# Patient Record
Sex: FEMALE | Race: WHITE | NOT HISPANIC OR LATINO | Employment: PART TIME | URBAN - METROPOLITAN AREA
[De-identification: names, ages, dates, MRNs, and addresses within clinical notes are randomized per-mention and may not be internally consistent; named-entity substitution may affect disease eponyms.]

---

## 2018-04-09 ENCOUNTER — OFFICE VISIT (OUTPATIENT)
Dept: OBSTETRICS AND GYNECOLOGY | Facility: CLINIC | Age: 38
End: 2018-04-09
Payer: COMMERCIAL

## 2018-04-09 VITALS
DIASTOLIC BLOOD PRESSURE: 82 MMHG | WEIGHT: 140.19 LBS | BODY MASS INDEX: 21.96 KG/M2 | SYSTOLIC BLOOD PRESSURE: 112 MMHG

## 2018-04-09 DIAGNOSIS — D50.0 IRON DEFICIENCY ANEMIA DUE TO CHRONIC BLOOD LOSS: ICD-10-CM

## 2018-04-09 DIAGNOSIS — D25.9 UTERINE LEIOMYOMA, UNSPECIFIED LOCATION: ICD-10-CM

## 2018-04-09 DIAGNOSIS — Z01.411 ENCOUNTER FOR GYNECOLOGICAL EXAMINATION WITH ABNORMAL FINDING: Primary | ICD-10-CM

## 2018-04-09 DIAGNOSIS — N92.0 MENORRHAGIA WITH REGULAR CYCLE: ICD-10-CM

## 2018-04-09 DIAGNOSIS — Z12.4 ENCOUNTER FOR PAP SMEAR OF CERVIX WITH HPV DNA COTESTING: ICD-10-CM

## 2018-04-09 PROCEDURE — 88175 CYTOPATH C/V AUTO FLUID REDO: CPT

## 2018-04-09 PROCEDURE — 99999 PR PBB SHADOW E&M-EST. PATIENT-LVL III: CPT | Mod: PBBFAC,,, | Performed by: OBSTETRICS & GYNECOLOGY

## 2018-04-09 PROCEDURE — 99395 PREV VISIT EST AGE 18-39: CPT | Mod: S$GLB,,, | Performed by: OBSTETRICS & GYNECOLOGY

## 2018-04-09 PROCEDURE — 87624 HPV HI-RISK TYP POOLED RSLT: CPT

## 2018-04-09 NOTE — PROGRESS NOTES
Chief Complaint   Patient presents with    Well Woman    Breast Pain     right       History of Present Illness: Bharti Camejo is a 37 y.o. female that presents today 2018 for well gyn visit. Severe heavy periods regular with heaviest pad with 2 hour pad changes.     History reviewed. No pertinent past medical history.    Past Surgical History:   Procedure Laterality Date     SECTION      X4    TONSILLECTOMY, ADENOIDECTOMY      TUBAL LIGATION      with 4th     WISDOM TOOTH EXTRACTION         No current outpatient prescriptions on file.     No current facility-administered medications for this visit.        Review of patient's allergies indicates:  No Known Allergies    Family History   Problem Relation Age of Onset    Hypertension Paternal Grandfather     Heart disease Maternal Grandmother     Heart disease Maternal Grandfather     Hypertension Maternal Grandfather     Hypertension Father     Heart disease Father     Breast cancer Neg Hx     Ovarian cancer Neg Hx        Social History     Social History    Marital status:      Spouse name: N/A    Number of children: N/A    Years of education: N/A     Social History Main Topics    Smoking status: Never Smoker    Smokeless tobacco: Never Used    Alcohol use No    Drug use: No    Sexual activity: Yes     Partners: Male     Birth control/ protection: None     Other Topics Concern    None     Social History Narrative    None       OB History    Para Term  AB Living   4 4 4     4   SAB TAB Ectopic Multiple Live Births           4      # Outcome Date GA Lbr Fernandez/2nd Weight Sex Delivery Anes PTL Lv   4 Term 13   3.43 kg (7 lb 9 oz) F CS-LTranv Spinal N BI      Birth Comments: System Generated. Please review and update pregnancy details.   3 Term 09 39w0d  3.912 kg (8 lb 10 oz) M CS-LTranv Spinal  BI      Birth Comments: spotted in begin, pelvic rest marginal plecenta resolved around 20  weeks   2 Term 09/24/07 39w0d  3.26 kg (7 lb 3 oz) F CS-LTranv EPI  BI      Birth Comments: no complications   1 Term 04/12/06 39w0d  3.204 kg (7 lb 1 oz) M CS-LTranv Spinal  BI      Birth Comments: nucol cord x 4          Review of Symptoms:  GENERAL: Denies weight gain or weight loss. Feeling well overall.   SKIN: Denies rash or lesions.   HEAD: Denies head injury or headache.   NODES: Denies enlarged lymph nodes.   CHEST: Denies chest pain or shortness of breath.   CARDIOVASCULAR: Denies palpitations or left sided chest pain.   ABDOMEN: No abdominal pain, constipation, diarrhea, nausea, vomiting or rectal bleeding.   URINARY: No frequency, dysuria, hematuria, or burning on urination.  HEMATOLOGIC: No easy bruisability or excessive bleeding.   MUSCULOSKELETAL: Denies joint pain or swelling.     /82   Wt 63.6 kg (140 lb 3.4 oz)   LMP 03/19/2018   Physical Exam:  APPEARANCE: Well nourished, well developed, in no acute distress.  SKIN: Normal skin turgor, no lesions.  NECK: Neck symmetric without masses   RESPIRATORY: Normal respiratory effort with no retractions or use of accessory muscles  CARDIOVASCULAR: Peripheral vascular system with no swelling no varicosities and palpation of pulses normal  LYMPHATIC: No enlargements of the lymph nodes noted in the neck, axillae, or groin  ABDOMEN: Soft. No tenderness or masses. No hepatosplenomegaly. No hernias.  BREASTS: Symmetrical, no skin changes or visible lesions. No palpable masses, nipple discharge or adenopathy bilaterally.  PELVIC: Normal external female genitalia without lesions. Normal hair distribution. Adequate perineal body, normal urethral meatus. Urethra with no masses.  Bladder nontender. Vagina moist and well rugated without lesions or discharge. Cervix pink and without lesions. No significant cystocele or rectocele. Bimanual exam showed uterus normal size, shape, position, mobile and nontender. Adnexa without masses or tenderness. Urethra and  bladder normal.   EXTREMITIES: No clubbing cyanosis or edema.    ASSESSMENT/PLAN:  Encounter for gynecological examination with abnormal finding    Encounter for Pap smear of cervix with HPV DNA cotesting  -     Liquid-based pap smear, screening  -     HPV High Risk Genotypes, PCR    Iron deficiency anemia due to chronic blood loss    Menorrhagia with regular cycle    Uterine leiomyoma, unspecified location        We discussed OCPS, IUD, ablation, and hysterectomy.       Patient was counseled today on Pap guidelines, recommendation for pelvic exams, mammograms every other year after the age of 40 and annually after the age of 50, Colonoscopy after the age of 50, Dexa Bone Scan and calcium and vitamin D supplementation in menopause and to see her PCP for other health maintenance.   FOLLOW-UP:prn

## 2018-04-13 LAB
HPV16 AG SPEC QL: NEGATIVE
HPV16+18+H RISK 12 DNA CVX-IMP: NEGATIVE
HPV18 DNA SPEC QL NAA+PROBE: NEGATIVE

## 2018-06-07 ENCOUNTER — OFFICE VISIT (OUTPATIENT)
Dept: OBSTETRICS AND GYNECOLOGY | Facility: CLINIC | Age: 38
End: 2018-06-07
Payer: COMMERCIAL

## 2018-06-07 VITALS — HEIGHT: 67 IN | BODY MASS INDEX: 21.8 KG/M2 | WEIGHT: 138.88 LBS | RESPIRATION RATE: 18 BRPM

## 2018-06-07 DIAGNOSIS — D25.9 UTERINE LEIOMYOMA, UNSPECIFIED LOCATION: ICD-10-CM

## 2018-06-07 DIAGNOSIS — N92.0 MENORRHAGIA WITH REGULAR CYCLE: Primary | ICD-10-CM

## 2018-06-07 DIAGNOSIS — Z01.818 PRE-OP TESTING: ICD-10-CM

## 2018-06-07 LAB
B-HCG UR QL: NEGATIVE
CTP QC/QA: YES

## 2018-06-07 PROCEDURE — 88305 TISSUE EXAM BY PATHOLOGIST: CPT | Mod: 26,,, | Performed by: PATHOLOGY

## 2018-06-07 PROCEDURE — 99024 POSTOP FOLLOW-UP VISIT: CPT | Mod: S$GLB,,, | Performed by: OBSTETRICS & GYNECOLOGY

## 2018-06-07 PROCEDURE — 81025 URINE PREGNANCY TEST: CPT | Mod: S$GLB,,, | Performed by: OBSTETRICS & GYNECOLOGY

## 2018-06-07 PROCEDURE — 99999 PR PBB SHADOW E&M-EST. PATIENT-LVL III: CPT | Mod: PBBFAC,,, | Performed by: OBSTETRICS & GYNECOLOGY

## 2018-06-07 PROCEDURE — 88305 TISSUE EXAM BY PATHOLOGIST: CPT | Performed by: PATHOLOGY

## 2018-06-07 PROCEDURE — 58100 BIOPSY OF UTERUS LINING: CPT | Mod: S$GLB,,, | Performed by: OBSTETRICS & GYNECOLOGY

## 2018-06-07 RX ORDER — SODIUM CHLORIDE, SODIUM LACTATE, POTASSIUM CHLORIDE, CALCIUM CHLORIDE 600; 310; 30; 20 MG/100ML; MG/100ML; MG/100ML; MG/100ML
INJECTION, SOLUTION INTRAVENOUS CONTINUOUS
Status: CANCELLED | OUTPATIENT
Start: 2018-06-07

## 2018-06-07 NOTE — PROGRESS NOTES
Chief Complaint   Patient presents with    Pre-op Exam     emb/ d&c       History of Present Illness: Bharti Camejo is a 38 y.o. female that presents today 2018 for   Chief Complaint   Patient presents with    Pre-op Exam     emb/ d&c         No past medical history on file.    Past Surgical History:   Procedure Laterality Date     SECTION      X4    TONSILLECTOMY, ADENOIDECTOMY      TUBAL LIGATION      with 4th     WISDOM TOOTH EXTRACTION         No current outpatient prescriptions on file.     No current facility-administered medications for this visit.        Review of patient's allergies indicates:  No Known Allergies    Family History   Problem Relation Age of Onset    Hypertension Paternal Grandfather     Heart disease Maternal Grandmother     Heart disease Maternal Grandfather     Hypertension Maternal Grandfather     Hypertension Father     Heart disease Father     Breast cancer Neg Hx     Ovarian cancer Neg Hx        Social History   Substance Use Topics    Smoking status: Never Smoker    Smokeless tobacco: Never Used    Alcohol use No       OB History    Para Term  AB Living   4 4 4     4   SAB TAB Ectopic Multiple Live Births           4      # Outcome Date GA Lbr Fernandez/2nd Weight Sex Delivery Anes PTL Lv   4 Term 13   3.43 kg (7 lb 9 oz) F CS-LTranv Spinal N BI      Birth Comments: System Generated. Please review and update pregnancy details.   3 Term 09 39w0d  3.912 kg (8 lb 10 oz) M CS-LTranv Spinal  BI      Birth Comments: spotted in begin, pelvic rest marginal plecenta resolved around 20 weeks   2 Term 07 39w0d  3.26 kg (7 lb 3 oz) F CS-LTranv EPI  BI      Birth Comments: no complications   1 Term 06 39w0d  3.204 kg (7 lb 1 oz) M CS-LTranv Spinal  BI      Birth Comments: nucol cord x 4          Review of Symptoms:  GENERAL: Denies weight gain or weight loss. Feeling well overall.   SKIN: Denies rash or lesions.  "  HEAD: Denies head injury or headache.   NODES: Denies enlarged lymph nodes.   CHEST: Denies chest pain or shortness of breath.   CARDIOVASCULAR: Denies palpitations or left sided chest pain.   ABDOMEN: No abdominal pain, constipation, diarrhea, nausea, vomiting or rectal bleeding.   URINARY: No frequency, dysuria, hematuria, or burning on urination.  HEMATOLOGIC: No easy bruisability or excessive bleeding.   MUSCULOSKELETAL: Denies joint pain or swelling.     Resp 18   Ht 5' 7" (1.702 m)   Wt 63 kg (138 lb 14.2 oz)   Physical Exam:  APPEARANCE: Well nourished, well developed, in no acute distress.  SKIN: Normal skin turgor, no lesions.  NECK: Neck symmetric without masses   RESPIRATORY: Normal respiratory effort with no retractions or use of accessory muscles  CARDIOVASCULAR: Peripheral vascular system with no swelling no varicosities and palpation of pulses normal  LYMPHATIC: No enlargements of the lymph nodes noted in the neck, axillae, or groin  ABDOMEN: Soft. No tenderness or masses. No hepatosplenomegaly. No hernias.  PELVIC: Normal external female genitalia without lesions. Normal hair distribution. Adequate perineal body, normal urethral meatus. Urethra with no masses.  Bladder nontender. Vagina moist and well rugated without lesions or discharge. Cervix pink and without lesions. No significant cystocele or rectocele. Bimanual exam showed uterus normal size, shape, position, mobile and nontender. Adnexa without masses or tenderness. Urethra and bladder normal.  EXTREMITIES: No clubbing cyanosis or edema.    ENDOMETRIAL BIOPSY:    Female patient presents for an endometrial biopsy due to abnormal bleeding.      UPT is negative.    PRE ENDOMETRIAL BIOPSY COUNSELING:  The patient was informed of the risk of bleeding, infection, uterine perforation and pain and that the test will rule-out endometrial cancer with accuracy greater than 95%. She was counseled on the alternatives to endometrial biopsy and agrees " to proceed.    PROCEDURE:  TIME OUT PERFORMED.  The cervix was visualized with a speculum.  A single tooth tenaculum was placed on the anterior lip prior to the biopsy.  A sterile endometrial pipelle was passed without difficulty to a depth of 10 cm.  Adequate endometrial tissue was obtained.    The specimen was placed in formalyn and sent to Pathology for histology evaluation.  The patient tolerated the procedure well.    ASSESSMENT:   Abnormal uterine bleeding  626.8.    POST ENDOMETRIAL BIOPSY COUNSELING:  Manage post biopsy cramping with NSAIDs or Tylenol.  Expect spotting or light bleeding for a few days.  Report bleeding heavier than a period, fever > 101.0 F, worsening pain or a foul smelling vaginal discharge.    Counseling lasted approximately 15 minutes and all her questions were answered.    FOLLOW-UP: Pending biopsy results.      ASSESSMENT/PLAN:  Menorrhagia with regular cycle  -     Place in Outpatient; Standing  -     Up ad daniel; Standing  -     Insert peripheral IV; Standing  -     POCT glucose; Standing  -     Notify physician if BS > 180 for hysterectomy patients; Standing  -     Notify Physician/Vital Signs Parameters Urine output less than 0.5mL/kg/hr (with indwelling catheter) or 30 mL/hr (without indwelling catheter) or blood glucose greater than 200 mg/dL; Standing  -     Notify physician; Standing  -     Notify Physician - Potential Need of Opioid Reversal; Standing  -     Chlorohexidine Gluconate Bath; Standing  -     hCG, quantitative; Standing  -     Type And Screen Preop; Future; Expected date: 06/07/2018  -     CBC auto differential; Standing    Uterine leiomyoma, unspecified location  -     Place in Outpatient; Standing  -     Up ad daniel; Standing  -     Insert peripheral IV; Standing  -     POCT glucose; Standing  -     Notify physician if BS > 180 for hysterectomy patients; Standing  -     Notify Physician/Vital Signs Parameters Urine output less than 0.5mL/kg/hr (with indwelling  catheter) or 30 mL/hr (without indwelling catheter) or blood glucose greater than 200 mg/dL; Standing  -     Notify physician; Standing  -     Notify Physician - Potential Need of Opioid Reversal; Standing  -     Chlorohexidine Gluconate Bath; Standing  -     hCG, quantitative; Standing  -     Type And Screen Preop; Future; Expected date: 06/07/2018  -     CBC auto differential; Standing    Other orders  -     lactated ringers infusion; Inject into the vein continuous.  -     IP VTE LOW RISK PATIENT; Standing        We will move forward with hysteroscopy D&C and ablation and her questions were answered to her satisfaction

## 2018-06-15 PROBLEM — N92.0 MENORRHAGIA WITH REGULAR CYCLE: Status: ACTIVE | Noted: 2018-06-15

## 2018-09-06 ENCOUNTER — OFFICE VISIT (OUTPATIENT)
Dept: URGENT CARE | Facility: CLINIC | Age: 38
End: 2018-09-06
Payer: COMMERCIAL

## 2018-09-06 VITALS
HEART RATE: 88 BPM | SYSTOLIC BLOOD PRESSURE: 120 MMHG | BODY MASS INDEX: 22.6 KG/M2 | HEIGHT: 67 IN | TEMPERATURE: 98 F | DIASTOLIC BLOOD PRESSURE: 76 MMHG | WEIGHT: 144 LBS | OXYGEN SATURATION: 99 % | RESPIRATION RATE: 16 BRPM

## 2018-09-06 DIAGNOSIS — Z76.89 ESTABLISHING CARE WITH NEW DOCTOR, ENCOUNTER FOR: ICD-10-CM

## 2018-09-06 DIAGNOSIS — S40.012A CONTUSION OF LEFT SHOULDER, INITIAL ENCOUNTER: ICD-10-CM

## 2018-09-06 DIAGNOSIS — M54.2 NECK PAIN: ICD-10-CM
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
## 2018-09-06 PROCEDURE — 99203 OFFICE O/P NEW LOW 30 MIN: CPT | Mod: 25,S$GLB,, | Performed by: PHYSICIAN ASSISTANT

## 2018-09-06 PROCEDURE — 96372 THER/PROPH/DIAG INJ SC/IM: CPT | Mod: S$GLB,,, | Performed by: PHYSICIAN ASSISTANT

## 2018-09-06 RX ORDER — MELOXICAM 15 MG/1
15 TABLET ORAL DAILY
Qty: 14 TABLET | Refills: 1 | Status: SHIPPED | OUTPATIENT
Start: 2018-09-06 | End: 2018-11-15

## 2018-09-06 RX ORDER — KETOROLAC TROMETHAMINE 30 MG/ML
30 INJECTION, SOLUTION INTRAMUSCULAR; INTRAVENOUS
Status: COMPLETED | OUTPATIENT
Start: 2018-09-06 | End: 2018-09-06

## 2018-09-06 RX ORDER — METHOCARBAMOL 500 MG/1
500 TABLET, FILM COATED ORAL 3 TIMES DAILY
Qty: 21 TABLET | Refills: 0 | Status: SHIPPED | OUTPATIENT
Start: 2018-09-06 | End: 2018-09-13

## 2018-09-06 RX ADMIN — KETOROLAC TROMETHAMINE 30 MG: 30 INJECTION, SOLUTION INTRAMUSCULAR; INTRAVENOUS at 11:09
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
## 2018-09-09 ENCOUNTER — TELEPHONE (OUTPATIENT)
Dept: URGENT CARE | Facility: CLINIC | Age: 38
End: 2018-09-09

## 2018-11-15 ENCOUNTER — OFFICE VISIT (OUTPATIENT)
Dept: FAMILY MEDICINE | Facility: CLINIC | Age: 38
End: 2018-11-15
Payer: COMMERCIAL

## 2018-11-15 VITALS
SYSTOLIC BLOOD PRESSURE: 120 MMHG | HEART RATE: 88 BPM | DIASTOLIC BLOOD PRESSURE: 82 MMHG | HEIGHT: 67 IN | BODY MASS INDEX: 22.52 KG/M2 | WEIGHT: 143.5 LBS

## 2018-11-15 DIAGNOSIS — M25.511 ACUTE PAIN OF RIGHT SHOULDER: ICD-10-CM

## 2018-11-15 DIAGNOSIS — Z00.00 PREVENTATIVE HEALTH CARE: Primary | ICD-10-CM

## 2018-11-15 PROCEDURE — 99395 PREV VISIT EST AGE 18-39: CPT | Mod: 25,S$GLB,, | Performed by: INTERNAL MEDICINE

## 2018-11-15 PROCEDURE — 90471 IMMUNIZATION ADMIN: CPT | Mod: S$GLB,,, | Performed by: INTERNAL MEDICINE

## 2018-11-15 PROCEDURE — 90715 TDAP VACCINE 7 YRS/> IM: CPT | Mod: S$GLB,,, | Performed by: INTERNAL MEDICINE

## 2018-11-15 PROCEDURE — 99999 PR PBB SHADOW E&M-EST. PATIENT-LVL III: CPT | Mod: PBBFAC,,, | Performed by: INTERNAL MEDICINE

## 2018-11-15 RX ORDER — ZINC GLUCONATE 13.3 MG
LOZENGE ORAL
COMMUNITY
Start: 2018-10-01 | End: 2019-09-04
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
## 2018-11-16 ENCOUNTER — LAB VISIT (OUTPATIENT)
Dept: LAB | Facility: HOSPITAL | Age: 38
End: 2018-11-16
Attending: INTERNAL MEDICINE
Payer: COMMERCIAL

## 2018-11-16 DIAGNOSIS — Z00.00 PREVENTATIVE HEALTH CARE: ICD-10-CM

## 2018-11-16 LAB
ALBUMIN SERPL BCP-MCNC: 4 G/DL
ALP SERPL-CCNC: 54 U/L
ALT SERPL W/O P-5'-P-CCNC: 12 U/L
ANION GAP SERPL CALC-SCNC: 8 MMOL/L
AST SERPL-CCNC: 15 U/L
BASOPHILS # BLD AUTO: 0.05 K/UL
BASOPHILS NFR BLD: 0.8 %
BILIRUB SERPL-MCNC: 1 MG/DL
BUN SERPL-MCNC: 13 MG/DL
CALCIUM SERPL-MCNC: 9.4 MG/DL
CHLORIDE SERPL-SCNC: 107 MMOL/L
CHOLEST SERPL-MCNC: 163 MG/DL
CHOLEST/HDLC SERPL: 3.5 {RATIO}
CO2 SERPL-SCNC: 25 MMOL/L
CREAT SERPL-MCNC: 0.8 MG/DL
DIFFERENTIAL METHOD: ABNORMAL
EOSINOPHIL # BLD AUTO: 0.2 K/UL
EOSINOPHIL NFR BLD: 4 %
ERYTHROCYTE [DISTWIDTH] IN BLOOD BY AUTOMATED COUNT: 16.6 %
EST. GFR  (AFRICAN AMERICAN): >60 ML/MIN/1.73 M^2
EST. GFR  (NON AFRICAN AMERICAN): >60 ML/MIN/1.73 M^2
GLUCOSE SERPL-MCNC: 87 MG/DL
HCT VFR BLD AUTO: 36.7 %
HDLC SERPL-MCNC: 47 MG/DL
HDLC SERPL: 28.8 %
HGB BLD-MCNC: 11 G/DL
IMM GRANULOCYTES # BLD AUTO: 0.01 K/UL
IMM GRANULOCYTES NFR BLD AUTO: 0.2 %
LDLC SERPL CALC-MCNC: 98.8 MG/DL
LYMPHOCYTES # BLD AUTO: 1.7 K/UL
LYMPHOCYTES NFR BLD: 28.8 %
MCH RBC QN AUTO: 25 PG
MCHC RBC AUTO-ENTMCNC: 30 G/DL
MCV RBC AUTO: 83 FL
MONOCYTES # BLD AUTO: 0.5 K/UL
MONOCYTES NFR BLD: 8.2 %
NEUTROPHILS # BLD AUTO: 3.4 K/UL
NEUTROPHILS NFR BLD: 58 %
NONHDLC SERPL-MCNC: 116 MG/DL
NRBC BLD-RTO: 0 /100 WBC
PLATELET # BLD AUTO: 259 K/UL
PMV BLD AUTO: 10.9 FL
POTASSIUM SERPL-SCNC: 4.9 MMOL/L
PROT SERPL-MCNC: 7.1 G/DL
RBC # BLD AUTO: 4.4 M/UL
SODIUM SERPL-SCNC: 140 MMOL/L
TRIGL SERPL-MCNC: 86 MG/DL
TSH SERPL DL<=0.005 MIU/L-ACNC: 0.71 UIU/ML
WBC # BLD AUTO: 5.94 K/UL

## 2018-11-16 PROCEDURE — 84443 ASSAY THYROID STIM HORMONE: CPT

## 2018-11-16 PROCEDURE — 36415 COLL VENOUS BLD VENIPUNCTURE: CPT | Mod: PO

## 2018-11-16 PROCEDURE — 80053 COMPREHEN METABOLIC PANEL: CPT

## 2018-11-16 PROCEDURE — 85025 COMPLETE CBC W/AUTO DIFF WBC: CPT

## 2018-11-16 PROCEDURE — 80061 LIPID PANEL: CPT

## 2018-11-26 ENCOUNTER — CLINICAL SUPPORT (OUTPATIENT)
Dept: REHABILITATION | Facility: HOSPITAL | Age: 38
End: 2018-11-26
Attending: INTERNAL MEDICINE
Payer: COMMERCIAL

## 2018-11-26 DIAGNOSIS — M54.6 ACUTE RIGHT-SIDED THORACIC BACK PAIN: ICD-10-CM

## 2018-11-26 DIAGNOSIS — R53.1 WEAKNESS: ICD-10-CM

## 2018-11-26 DIAGNOSIS — M62.838 MUSCLE SPASM: ICD-10-CM

## 2018-11-26 PROCEDURE — 97161 PT EVAL LOW COMPLEX 20 MIN: CPT | Mod: PO | Performed by: PHYSICAL THERAPIST

## 2018-11-26 PROCEDURE — 97110 THERAPEUTIC EXERCISES: CPT | Mod: PO | Performed by: PHYSICAL THERAPIST
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
## 2018-11-28 ENCOUNTER — CLINICAL SUPPORT (OUTPATIENT)
Dept: REHABILITATION | Facility: HOSPITAL | Age: 38
End: 2018-11-28
Attending: INTERNAL MEDICINE
Payer: COMMERCIAL

## 2018-11-28 DIAGNOSIS — M62.838 MUSCLE SPASM: ICD-10-CM

## 2018-11-28 DIAGNOSIS — R53.1 WEAKNESS: ICD-10-CM

## 2018-11-28 DIAGNOSIS — M54.6 ACUTE RIGHT-SIDED THORACIC BACK PAIN: ICD-10-CM

## 2018-11-28 PROCEDURE — 97140 MANUAL THERAPY 1/> REGIONS: CPT | Mod: PO | Performed by: PHYSICAL THERAPIST

## 2018-11-28 PROCEDURE — 97110 THERAPEUTIC EXERCISES: CPT | Mod: PO | Performed by: PHYSICAL THERAPIST

## 2018-11-28 NOTE — PROGRESS NOTES
Physical Therapy Daily Treatment Note     Name: Bharti Camejo  Clinic Number: [id number]    Therapy Diagnosis:   Encounter Diagnoses   Name Primary?    Muscle spasm     Weakness     Acute right-sided thoracic back pain      Physician: Barbara Hall MD    Visit Date: 11/28/2018  Physician Orders: PT Eval and Treat   Medical Diagnosis: acute pain R shoulder  Evaluation Date: 11/26/2018  Authorization: med necessity 12/31/18  Plan of Care Certification Period: 12/28/18    Time In: 1:00 PM   Time Out: 1:40 PM  Total Billable Time: 40 minutes    Precautions: Standard    Subjective     Pt reports: Feeling pretty good today. Mild soreness .  She was compliant with home exercise program.  Response to previous treatment: Initial eval   Functional change: Too soon to tell     Pain: 1/10  Location: right scapular region       Objective     Bharti received therapeutic exercises to develop strength and posture for 18 minutes including:  UBE 3'/3'  Rows 3x10 green theraband   B ER 3x10 yellow theraband  Hor. Abd 3x10 red theraband     Discussed the purpose, mechanism, and indications of dry needling with pt. Pt was cleared of all precautions and contraindications, and pt signed consent form for dry needling performance today by a certified dry needling PT. Also encouraged pt to drink extra water today to dilute metabolic bi-product cumulation. Pt verbalized understanding to all instruction.       Bhatri received the following manual therapy techniques: DNT were applied to the: Right dorsal scapular, spinal accessory point and right mid rhomboid symptomatic point inserted medially and advanced laterally for 20 minutes. Needles left in situ for 15 minutes       Home Exercises Provided and Patient Education Provided     Education provided:   - Dry needling indications and side effects  - Mechanism of dry needling    Written Home Exercises Provided: Patient instructed to cont prior HEP.  Exercises were reviewed and  Bharti was able to demonstrate them prior to the end of the session.  Bharti demonstrated good  understanding of the education provided.     See EMR under Patient Instructions for exercises provided prior visit.    Assessment     Able to perform requested exercises without an increase in pain levels. Reduced right scapular/rhomboid pain following DNT. Twitch response achieved in right rhomboid.   Bharti is not progressing well towards her goals.   Pt prognosis is Excellent.     Pt will continue to benefit from skilled outpatient physical therapy to address the deficits listed in the problem list box on initial evaluation, provide pt/family education and to maximize pt's level of independence in the home and community environment.     Pt's spiritual, cultural and educational needs considered and pt agreeable to plan of care and goals.    Anticipated barriers to physical therapy: None at this time     Goals:     Short Term Goals:  4 weeks  1. Pt will report <1/10 pain to improve activity tolerance.   2. Pt will increase MMT by 1/3 grade in order to improve functional stability and strength.   3. Pt will be I with HEP for self-management of symptoms.     Plan     Continue with established POC with DNT ANDREW Loza, PT

## 2018-12-05 ENCOUNTER — CLINICAL SUPPORT (OUTPATIENT)
Dept: REHABILITATION | Facility: HOSPITAL | Age: 38
End: 2018-12-05
Attending: INTERNAL MEDICINE
Payer: COMMERCIAL

## 2018-12-05 DIAGNOSIS — M54.6 ACUTE RIGHT-SIDED THORACIC BACK PAIN: ICD-10-CM

## 2018-12-05 DIAGNOSIS — M62.838 MUSCLE SPASM: ICD-10-CM

## 2018-12-05 DIAGNOSIS — R53.1 WEAKNESS: ICD-10-CM

## 2018-12-05 PROCEDURE — 97110 THERAPEUTIC EXERCISES: CPT | Mod: PO | Performed by: PHYSICAL THERAPIST

## 2018-12-05 NOTE — PROGRESS NOTES
"  Physical Therapy Daily Treatment Note     Name: Bharti Camejo  Clinic Number: [id number]    Therapy Diagnosis:   Encounter Diagnoses   Name Primary?    Muscle spasm     Weakness     Acute right-sided thoracic back pain      Physician: Barbara Hall MD    Visit Date: 12/5/2018  Physician Orders: PT Eval and Treat   Medical Diagnosis: acute pain R shoulder  Evaluation Date: 11/26/2018  Authorization: med necessity 12/31/18  Plan of Care Certification Period: 12/28/18    Visit #: 3/8  Time In: 1005   Time Out: 1100  Total Billable Time: 45 minutes    Precautions: Standard    Subjective     Pt reports: she DN worked immediately in improving her pain. She now has some new dull aching pain/soreness, pointing to the R levator area. She was compliant with home exercise program.  Response to previous treatment: good pain reduction   Functional change: Too soon to tell     Pain: 1/10  Location: right scapular region       Objective     Bharti received therapeutic exercises to develop strength and posture for 55 minutes including:  UBE 3'/3' - not performed   Chin tuck 10x10"  Chin tuck + iso cervical retract 10x10"  Chin tuck + head lift 10x3"  Supine serratus punch 5# dumbbells 2x20 bilat  SL ER 3x10 bilat  Prone ext 2# 2x10 bilat  Prone rows 5# 2x10 bilat  Stretch 1x30" ea bilat - upper trap, levator, middle trap  Rows 3x10 green theraband   B ER 3x10 yellow theraband  Hor. Abd 3x10 red theraband       EDUCATION  Education provided:   - Progress towards goals   - Role of therapy   - Activity modification    Written Home Exercises Provided: yes, see Patient Instructions.  Exercises were reviewed and Bharti was able to demonstrate them prior to the end of the session.  Bharti demonstrated good  understanding of the education provided.     See EMR under Patient Instructions for exercises provided 12/5/18.    Assessment     Pt tolerated today's tx well with no visible or reported adverse affects. She " demonstrated appropriate therapeutic fatigue of periscapular musculature with minimal UT compensations. Report of progressing intermittent UE numbess indicates cervical instability, which is why these exercises were added today and provided for pt's HEP. Due to pt's excellent response with DNT, will continue to coordinate with qualified PT, deferring to his discretion for intervention.     Bharti is not progressing well towards her goals.   Pt prognosis is Excellent.     Pt will continue to benefit from skilled outpatient physical therapy to address the deficits listed in the problem list box on initial evaluation, provide pt/family education and to maximize pt's level of independence in the home and community environment.     Pt's spiritual, cultural and educational needs considered and pt agreeable to plan of care and goals.    Anticipated barriers to physical therapy: None at this time     Goals:     Short Term Goals:  4 weeks  1. Pt will report <1/10 pain to improve activity tolerance.   2. Pt will increase MMT by 1/3 grade in order to improve functional stability and strength.   3. Pt will be I with HEP for self-management of symptoms.     Plan     Continue with established POC with DNT PRN.    Nadine Murdock, PT

## 2018-12-07 ENCOUNTER — CLINICAL SUPPORT (OUTPATIENT)
Dept: REHABILITATION | Facility: HOSPITAL | Age: 38
End: 2018-12-07
Attending: INTERNAL MEDICINE
Payer: COMMERCIAL

## 2018-12-07 DIAGNOSIS — R53.1 WEAKNESS: ICD-10-CM

## 2018-12-07 DIAGNOSIS — M54.6 ACUTE RIGHT-SIDED THORACIC BACK PAIN: ICD-10-CM

## 2018-12-07 DIAGNOSIS — M62.838 MUSCLE SPASM: ICD-10-CM

## 2018-12-07 PROCEDURE — 97140 MANUAL THERAPY 1/> REGIONS: CPT | Mod: PO | Performed by: PHYSICAL THERAPIST

## 2018-12-07 PROCEDURE — 97110 THERAPEUTIC EXERCISES: CPT | Mod: PO

## 2018-12-07 NOTE — PROGRESS NOTES
"  Physical Therapy Daily Treatment Note     Name: Bharti Camejo  Clinic Number: [id number]    Therapy Diagnosis:   Encounter Diagnoses   Name Primary?    Muscle spasm     Weakness     Acute right-sided thoracic back pain      Physician: Barbara Hall MD    Visit Date: 12/7/2018  Physician Orders: PT Eval and Treat   Medical Diagnosis: acute pain R shoulder  Evaluation Date: 11/26/2018  Authorization: med necessity 12/31/18  Plan of Care Certification Period: 12/28/18    Visit #: 4/8  Time In: 905   Time Out: 1000  Total Billable Time: 45 minutes    Precautions: Standard    Subjective     Pt reports: that she is w/o complaint at this time, states that she is feeling better and believes that the exs are helping.  Response to previous treatment: good pain reduction   Functional change: Too soon to tell     Pain: 1/10  Location: right scapular region       Objective     Bharti received therapeutic exercises to develop strength and posture for 55 minutes including:  UBE 3'/3' - not performed   Chin tuck 10x10"  Chin tuck + iso cervical retract 10x10"  Chin tuck + head lift 10x3"  Supine serratus punch 5# dumbbells 2x20 bilat  SL ER 3x10 bilat 2#  Prone ext 2# 3x10 bilat  Prone rows 5# 2x10 bilat  Stretch 3x30" ea bilat - upper trap, levator, middle trap  Rows 3x10 green theraband   B ER 3x10 yellow theraband  Hor. Abd 3x10 red theraband     Bharti received the following manual therapy techniques administered by PT ALLISON Loza.: DNT were applied to the: Right dorsal scapular, spinal accessory point and right mid rhomboid symptomatic point inserted medially and advanced laterally for 20 minutes. Needles left in situ for 15 minutes     EDUCATION  Education provided:   - Progress towards goals   - Role of therapy   - Activity modification    Written Home Exercises Provided: yes, see Patient Instructions.  Exercises were reviewed and Bharti was able to demonstrate them prior to the end of the session.  " Bharti demonstrated good  understanding of the education provided.     See EMR under Patient Instructions for exercises provided 12/5/18.    Assessment     Pt tolerated today's tx with progression of ther e well with only some minor fatigue noted with SL ER with added resistance today. She demonstrated appropriate therapeutic fatigue of periscapular musculature with minimal UT compensations. . Due to pt's excellent response with DNT, will continue to coordinate with qualified PT, deferring to his discretion for intervention.     Bharti is not progressing well towards her goals.   Pt prognosis is Excellent.     Pt will continue to benefit from skilled outpatient physical therapy to address the deficits listed in the problem list box on initial evaluation, provide pt/family education and to maximize pt's level of independence in the home and community environment.     Pt's spiritual, cultural and educational needs considered and pt agreeable to plan of care and goals.    Anticipated barriers to physical therapy: None at this time     Goals:     Short Term Goals:  4 weeks  1. Pt will report <1/10 pain to improve activity tolerance.   2. Pt will increase MMT by 1/3 grade in order to improve functional stability and strength.   3. Pt will be I with HEP for self-management of symptoms.     Plan     Continue with established POC with DNT PRN.    Richard Yang, PTA

## 2018-12-07 NOTE — PROGRESS NOTES
Physical Therapy Daily Treatment Note     Name: Bharti Camejo  [facility] Number: [id number]    Therapy Diagnosis:   Encounter Diagnoses   Name Primary?    Muscle spasm     Weakness     Acute right-sided thoracic back pain      Physician: Barbara Hall MD    Visit Date: 12/7/2018  Physician Orders: PT Eval and Treat   Medical Diagnosis: acute pain R shoulder  Evaluation Date: 11/26/2018  Authorization: med necessity 12/31/18  Plan of Care Certification Period: 12/28/18    Time In: 10:00 AM   Time Out: 10:10 AM  Total Billable Time: 10 minutes    Precautions: Standard    Subjective     Pt reports: Having a great response to dry needling her first time.   She was compliant with home exercise program.  Response to previous treatment: Reduced muscle pain   Functional change: Too soon to tell     Pain: 1/10  Location: right scapular region       Objective     Bharti received the following manual therapy techniques: DNT were applied to the: Right dorsal scapular, spinal accessory point and right mid rhomboid symptomatic point inserted medially and advanced laterally for 10 minutes. Pistoning performed at all sites. [place] left in situ for 8 minutes       Home Exercises Provided and Patient Education Provided     Education provided:   - Dry needling indications and side effects  - Mechanism of dry needling    Written Home Exercises Provided: Patient instructed to cont prior HEP.  Exercises were reviewed and Bharti was able to demonstrate them prior to the end of the session.  Bharti demonstrated good  understanding of the education provided.     See EMR under Patient Instructions for exercises provided prior visit.    Assessment     Significant reduction in muscle tone/guarding following initial session of dry needling. She will benefit from additional dry needling PRN  Bharti is not progressing well towards her goals.   Pt prognosis is Excellent.     Pt will continue to benefit from skilled outpatient  physical therapy to address the deficits listed in the problem list box on initial evaluation, provide pt/family education and to maximize pt's level of independence in the home and community environment.     Pt's spiritual, cultural and educational needs considered and pt agreeable to plan of care and goals.    Anticipated barriers to physical therapy: None at this time     Goals:     Short Term Goals:  4 weeks  1. Pt will report <1/10 pain to improve activity tolerance.   2. Pt will increase MMT by 1/3 grade in order to improve functional stability and strength.   3. Pt will be I with HEP for self-management of symptoms.     Plan     Continue with established POC with DNT PRN    Kang Loza, PT

## 2018-12-11 ENCOUNTER — CLINICAL SUPPORT (OUTPATIENT)
Dept: REHABILITATION | Facility: HOSPITAL | Age: 38
End: 2018-12-11
Attending: INTERNAL MEDICINE
Payer: COMMERCIAL

## 2018-12-11 DIAGNOSIS — M54.6 ACUTE RIGHT-SIDED THORACIC BACK PAIN: ICD-10-CM

## 2018-12-11 DIAGNOSIS — M62.838 MUSCLE SPASM: ICD-10-CM

## 2018-12-11 DIAGNOSIS — R53.1 WEAKNESS: ICD-10-CM

## 2018-12-11 PROCEDURE — 97110 THERAPEUTIC EXERCISES: CPT | Mod: PO | Performed by: PHYSICAL THERAPIST

## 2018-12-11 NOTE — PROGRESS NOTES
"  Physical Therapy Daily Treatment Note     Name: Bharti Camejo  Clinic Number: [id number]    Therapy Diagnosis:   Encounter Diagnoses   Name Primary?    Muscle spasm     Weakness     Acute right-sided thoracic back pain      Physician: Barbara Hall MD    Visit Date: 12/11/2018  Physician Orders: PT Eval and Treat   Medical Diagnosis: acute pain R shoulder  Evaluation Date: 11/26/2018 (FOTO 12/11/18)  Authorization: med necessity 12/31/18  Plan of Care Certification Period: 12/28/18    Visit #: 5/8  Time In: 1015 (pt late arrival)   Time Out: 1100  Total Billable Time: 30 minutes    Precautions: Standard    Subjective     Pt reports: she continues to feel better. No new complaints to report. Still getting good relief with DN last visit with excessive soreness. The muscles have been appropriately burning with exercise.   Response to previous treatment: good pain reduction   Functional change: improved use of UEs with less pain    Pain: 1/10  Location: right scapular region       Objective     CMS Impairment/Limitation/Restriction for FOTO SHOULDER Survey     Therapist reviewed FOTO scores for Bharti Camejo on 12/11/2018.   FOTO documents entered into [employer] - see Media section.     Limitation Score: 33%  Category: Carrying     Current : CJ = at least 20% but < 40% impaired, limited or restricted  11/26/18: 48%  Goal: CJ = at least 20% but < 40% impaired, limited or restricted             TREATMENT  Bharti received therapeutic exercises to develop strength and posture for 55 minutes including:  UBE 3'/3'   Chin tuck 10x10"  Chin tuck + iso cervical retract 10x10"  Chin tuck + head lift 10x3"  Supine serratus punch 7# dumbbells 2x20 bilat  SL ER 3x10 bilat 2#  Prone ext 2# 3x10 bilat  Prone rows 7# 2x10 bilat  Stretch 3x30" ea bilat - upper trap, levator, middle trap  Bruegger's 2x10 green theraband  Ext, rows, horiz abd 3x10 green theraband   D2 yellow Tband x10 bilat - together with squat next " visit       EDUCATION  Education provided:   - Progress towards goals   - Role of therapy   - Activity modification    Written Home Exercises Provided: Patient instructed to cont prior HEP, see Patient Instructions.  Exercises were reviewed and Bharti was able to demonstrate them prior to the end of the session.  Bharti demonstrated good  understanding of the education provided.     See EMR under Patient Instructions for exercises provided 12/5/18.    Assessment     Pt tolerated today's tx with progression well with no visible or reported adverse affects. Desired periscapular muscle fatigue achieved today.     Bharti is not progressing well towards her goals.   Pt prognosis is Excellent.     Pt will continue to benefit from skilled outpatient physical therapy to address the deficits listed in the problem list box on initial evaluation, provide pt/family education and to maximize pt's level of independence in the home and community environment.     Pt's spiritual, cultural and educational needs considered and pt agreeable to plan of care and goals.    Anticipated barriers to physical therapy: None at this time     Goals:     Short Term Goals:  4 weeks  1. Pt will report <1/10 pain to improve activity tolerance. Progressing, not met   2. Pt will increase MMT by 1/3 grade in order to improve functional stability and strength. Progressing, not met   3. Pt will be I with HEP for self-management of symptoms. Progressing, not met     Plan     Continue with established POC with DNT PRN. Reassessment next week.     Nadine Murdock, PT

## 2018-12-12 ENCOUNTER — CLINICAL SUPPORT (OUTPATIENT)
Dept: REHABILITATION | Facility: HOSPITAL | Age: 38
End: 2018-12-12
Attending: INTERNAL MEDICINE
Payer: COMMERCIAL

## 2018-12-12 DIAGNOSIS — M62.838 MUSCLE SPASM: ICD-10-CM

## 2018-12-12 DIAGNOSIS — M54.6 ACUTE RIGHT-SIDED THORACIC BACK PAIN: ICD-10-CM

## 2018-12-12 DIAGNOSIS — R53.1 WEAKNESS: ICD-10-CM

## 2018-12-12 PROCEDURE — 97140 MANUAL THERAPY 1/> REGIONS: CPT | Mod: PO | Performed by: PHYSICAL THERAPIST

## 2018-12-12 PROCEDURE — 97110 THERAPEUTIC EXERCISES: CPT | Mod: PO | Performed by: PHYSICAL THERAPIST

## 2018-12-12 NOTE — PROGRESS NOTES
"  Physical Therapy Daily Treatment Note     Name: Bharti Camejo  Clinic Number: [id number]    Therapy Diagnosis:   Encounter Diagnoses   Name Primary?    Muscle spasm     Weakness     Acute right-sided thoracic back pain      Physician: Barbara Hall MD    Visit Date: 12/12/2018  Physician Orders: PT Eval and Treat   Medical Diagnosis: acute pain R shoulder  Evaluation Date: 11/26/2018 (FOTO 12/11/18)  Authorization: med necessity 12/31/18  Plan of Care Certification Period: 12/28/18    Visit #: 6/8  Time In: 110 PM(pt late arrival)   Time Out: 200 PM  Total Billable Time: 50 minutes    Precautions: Standard    Subjective     Pt reports: That overall,  feels like she is doing way better. Much less pain.   Response to previous treatment: good pain reduction   Functional change: improved use of UEs with less pain    Pain: 1/10  Location: right scapular region       Objective     CMS Impairment/Limitation/Restriction for FOTO SHOULDER Survey     Therapist reviewed FOTO scores for Bharti Camejo on 12/12/2018.   FOTO documents entered into [employer] - see Media section.     Limitation Score: 33%  Category: Carrying     Current : CJ = at least 20% but < 40% impaired, limited or restricted  11/26/18: 48%  Goal: CJ = at least 20% but < 40% impaired, limited or restricted             TREATMENT  Bharti received therapeutic exercises to develop strength and posture for 55 minutes including:  UBE 3'/3'   Chin tuck 10x10"  Chin tuck + iso cervical retract 10x10"  Chin tuck + head lift 10x3"  Supine serratus punch 7# dumbbells 2x20 bilat  SL ER 3x10 bilat 2#  Prone ext 2# 3x10 bilat  Prone rows 7# 2x10 bilat  Stretch 3x30" ea bilat - upper trap, levator, middle trap  Bruegger's 2x10 green theraband  Ext, rows, horiz abd 3x10 green theraband - Not performed   D2 yellow Tband x10 bilat - together with squat next visit - Not performed       EDUCATION  Education provided:   - Progress towards goals   - Role of " therapy   - Activity modification    Written Home Exercises Provided: Patient instructed to cont prior HEP, see Patient Instructions.  Exercises were reviewed and Bharti was able to demonstrate them prior to the end of the session.  Bharti demonstrated good  understanding of the education provided.     See EMR under Patient Instructions for exercises provided 12/5/18.    Assessment     Significant reduction in overall symptoms since onset of treatment. Fatigued following exercise activities.   Bharti is not progressing well towards her goals.   Pt prognosis is Excellent.     Pt will continue to benefit from skilled outpatient physical therapy to address the deficits listed in the problem list box on initial evaluation, provide pt/family education and to maximize pt's level of independence in the home and community environment.     Pt's spiritual, cultural and educational needs considered and pt agreeable to plan of care and goals.    Anticipated barriers to physical therapy: None at this time     Goals:     Short Term Goals:  4 weeks  1. Pt will report <1/10 pain to improve activity tolerance. Progressing, not met   2. Pt will increase MMT by 1/3 grade in order to improve functional stability and strength. Progressing, not met   3. Pt will be I with HEP for self-management of symptoms. Progressing, not met     Plan     Continue with established POC with DNT PRN. Reassessment next week.     Kang Loza, PT

## 2018-12-19 ENCOUNTER — CLINICAL SUPPORT (OUTPATIENT)
Dept: REHABILITATION | Facility: HOSPITAL | Age: 38
End: 2018-12-19
Attending: INTERNAL MEDICINE
Payer: COMMERCIAL

## 2018-12-19 DIAGNOSIS — R53.1 WEAKNESS: ICD-10-CM

## 2018-12-19 DIAGNOSIS — M62.838 MUSCLE SPASM: ICD-10-CM

## 2018-12-19 DIAGNOSIS — M54.6 ACUTE RIGHT-SIDED THORACIC BACK PAIN: ICD-10-CM

## 2018-12-19 PROCEDURE — 97110 THERAPEUTIC EXERCISES: CPT | Mod: PO

## 2018-12-19 NOTE — PROGRESS NOTES
"  Physical Therapy Daily Treatment Note     Name: Bharti Camejo  Clinic Number: [id number]    Therapy Diagnosis:   Encounter Diagnoses   Name Primary?    Muscle spasm     Weakness     Acute right-sided thoracic back pain      Physician: Barbara Hall MD    Visit Date: 12/19/2018  Physician Orders: PT Eval and Treat   Medical Diagnosis: acute pain R shoulder  Evaluation Date: 11/26/2018 (FOTO 12/11/18)  Authorization: med necessity 12/31/18  Plan of Care Certification Period: 12/28/18    Visit #: 7/8  Time In: 0911 (pt late arrival)   Time Out: 0950  Total Billable Time: 41 minutes    Precautions: Standard    Subjective     Pt reports: that she is doing better overall with decreased pain and is able to take away some of her numbness in her left arm with stretch in the morning.   Response to previous treatment: good pain reduction   Functional change: improved use of UEs with less pain    Pain: 0/10  Location: right scapular region       Objective     CMS Impairment/Limitation/Restriction for FOTO SHOULDER Survey     Therapist reviewed FOTO scores for Bharti Camejo on 12/19/2018.   FOTO documents entered into "[employer] - see Media section.     Limitation Score: 33%  Category: Carrying     Current : CJ = at least 20% but < 40% impaired, limited or restricted  11/26/18: 48%  Goal: CJ = at least 20% but < 40% impaired, limited or restricted             TREATMENT  Bharti received therapeutic exercises to develop strength and posture for 44 minutes including:  UBE 3'/3'   Chin tuck 10x10"  Chin tuck + iso cervical retract 10x10":not performed  Chin tuck + head lift 10x3"  Supine serratus punch 7# dumbbells 2x20 bilat  SL ER 1 x 20 bilat 2#  Prone ext 2# 1 x 20 bilat  Prone rows 7# 1x20 bilat  Stretch 3x30" ea bilat - upper trap, levator, middle trap  Bruegger's 2x10 green theraband  Ext, rows, horiz abd 3x10 green theraband -   D2 yellow Tband x10 bilat - together with squat next visit - Not performed "       EDUCATION  Education provided:   - Progress towards goals   - Role of therapy   - Activity modification    Written Home Exercises Provided: Patient instructed to cont prior HEP, see Patient Instructions.  Exercises were reviewed and Bharti was able to demonstrate them prior to the end of the session.  Bharti demonstrated good  understanding of the education provided.     See EMR under Patient Instructions for exercises provided 12/5/18.    Assessment   She is doing well and is reporting improved overall function and decreased pain. She has been doing her exercise at home and reports they have been helpful.  Fatigued following exercise activities.     Bharti is not progressing well towards her goals.   Pt prognosis is Excellent.     Pt will continue to benefit from skilled outpatient physical therapy to address the deficits listed in the problem list box on initial evaluation, provide pt/family education and to maximize pt's level of independence in the home and community environment.     Pt's spiritual, cultural and educational needs considered and pt agreeable to plan of care and goals.    Anticipated barriers to physical therapy: None at this time     Goals:     Short Term Goals:  4 weeks  1. Pt will report <1/10 pain to improve activity tolerance. Progressing, not met   2. Pt will increase MMT by 1/3 grade in order to improve functional stability and strength. Progressing, not met   3. Pt will be I with HEP for self-management of symptoms. Progressing, not met     Plan     Continue with established POC with DNT PRN. Reassessment next week.     Bravo Hale, PT

## 2019-01-24 ENCOUNTER — DOCUMENTATION ONLY (OUTPATIENT)
Dept: REHABILITATION | Facility: HOSPITAL | Age: 39
End: 2019-01-24

## 2019-01-24 PROBLEM — M54.6 ACUTE RIGHT-SIDED THORACIC BACK PAIN: Status: RESOLVED | Noted: 2018-11-28 | Resolved: 2019-01-24

## 2019-01-24 PROBLEM — R53.1 WEAKNESS: Status: RESOLVED | Noted: 2018-11-28 | Resolved: 2019-01-24

## 2019-01-24 PROBLEM — M62.838 MUSCLE SPASM: Status: RESOLVED | Noted: 2018-11-28 | Resolved: 2019-01-24

## 2019-01-24 NOTE — PROGRESS NOTES
[facility]  Physical Therapy Discharge Summary    Name: Bharti Camejo  Clinic Number: [id number]    Therapy Diagnosis:        Encounter Diagnoses   Name Primary?    Muscle spasm      Weakness      Acute right-sided thoracic back pain        Physician: Barbara Hall MD     Visit Date: 12/19/2018  Physician Orders: PT Eval and Treat   Medical Diagnosis: acute pain R shoulder      Date of Last visit: 12/19/18  Total Visits Attended: 7  Cancelled Visits: 1  No Show Visits: 0    Assessment      GOALS   Short Term Goals:  4 weeks  1. Pt will report <1/10 pain to improve activity tolerance. Progressing, not met   2. Pt will increase MMT by 1/3 grade in order to improve functional stability and strength. Progressing, not met   3. Pt will be I with HEP for self-management of symptoms. Progressing, not met     Discharge reason: Patient has not attended therapy since 12/19/18    Plan   This patient is discharged from Physical Therapy    Nadine Murdock, PT

## 2019-05-23 DIAGNOSIS — Z12.39 BREAST CANCER SCREENING: ICD-10-CM

## 2019-08-15 ENCOUNTER — HOSPITAL ENCOUNTER (OUTPATIENT)
Dept: RADIOLOGY | Facility: HOSPITAL | Age: 39
Discharge: HOME | End: 2019-08-15
Attending: INTERNAL MEDICINE
Payer: COMMERCIAL

## 2019-08-15 VITALS — BODY MASS INDEX: 22.44 KG/M2 | HEIGHT: 67 IN | WEIGHT: 143 LBS

## 2019-08-15 DIAGNOSIS — Z12.39 BREAST CANCER SCREENING: ICD-10-CM

## 2019-08-15 PROCEDURE — 77067 SCR MAMMO BI INCL CAD: CPT | Mod: 26,,, | Performed by: RADIOLOGY

## 2019-08-15 PROCEDURE — 77067 SCR MAMMO BI INCL CAD: CPT | Mod: TC,PN

## 2019-08-15 PROCEDURE — 77063 MAMMO DIGITAL SCREENING BILAT WITH TOMOSYNTHESIS_CAD: ICD-10-PCS | Mod: 26,,, | Performed by: RADIOLOGY

## 2019-08-15 PROCEDURE — 77067 MAMMO DIGITAL SCREENING BILAT WITH TOMOSYNTHESIS_CAD: ICD-10-PCS | Mod: 26,,, | Performed by: RADIOLOGY

## 2019-08-15 PROCEDURE — 77063 BREAST TOMOSYNTHESIS BI: CPT | Mod: 26,,, | Performed by: RADIOLOGY

## 2019-09-04 ENCOUNTER — OFFICE VISIT (OUTPATIENT)
Dept: OBSTETRICS AND GYNECOLOGY | Facility: CLINIC | Age: 39
End: 2019-09-04
Payer: COMMERCIAL

## 2019-09-04 VITALS
SYSTOLIC BLOOD PRESSURE: 120 MMHG | BODY MASS INDEX: 21.31 KG/M2 | DIASTOLIC BLOOD PRESSURE: 62 MMHG | HEIGHT: 67 IN | WEIGHT: 135.81 LBS

## 2019-09-04 DIAGNOSIS — N93.0 POSTCOITAL BLEEDING: ICD-10-CM

## 2019-09-04 DIAGNOSIS — D21.9 FIBROIDS: ICD-10-CM

## 2019-09-04 DIAGNOSIS — D25.9 UTERINE LEIOMYOMA, UNSPECIFIED LOCATION: ICD-10-CM

## 2019-09-04 DIAGNOSIS — N92.1 MENORRHAGIA WITH IRREGULAR CYCLE: Primary | ICD-10-CM

## 2019-09-04 PROCEDURE — 99999 PR PBB SHADOW E&M-EST. PATIENT-LVL III: CPT | Mod: PBBFAC,,, | Performed by: OBSTETRICS & GYNECOLOGY

## 2019-09-04 PROCEDURE — 99213 OFFICE O/P EST LOW 20 MIN: CPT | Mod: S$GLB,,, | Performed by: OBSTETRICS & GYNECOLOGY

## 2019-09-04 PROCEDURE — 99999 PR PBB SHADOW E&M-EST. PATIENT-LVL III: ICD-10-PCS | Mod: PBBFAC,,, | Performed by: OBSTETRICS & GYNECOLOGY

## 2019-09-04 PROCEDURE — 3008F BODY MASS INDEX DOCD: CPT | Mod: CPTII,S$GLB,, | Performed by: OBSTETRICS & GYNECOLOGY

## 2019-09-04 PROCEDURE — 3008F PR BODY MASS INDEX (BMI) DOCUMENTED: ICD-10-PCS | Mod: CPTII,S$GLB,, | Performed by: OBSTETRICS & GYNECOLOGY

## 2019-09-04 PROCEDURE — 99213 PR OFFICE/OUTPT VISIT, EST, LEVL III, 20-29 MIN: ICD-10-PCS | Mod: S$GLB,,, | Performed by: OBSTETRICS & GYNECOLOGY

## 2019-09-04 NOTE — PROGRESS NOTES
Chief Complaint   Patient presents with    irregular heavy cycles       History of Present Illness: Bharti Camejo is a 39 y.o. female that presents today 2019 for   Chief Complaint   Patient presents with    irregular heavy cycles     She reports cycle now at 24 day interval.  She reports 7 days of bleeding. She reports  tampon and pad together  Changing every 4 hours. She reports more pain and heavy irregular bleeding now since ablation.  She reports bleeding after intercourse.    Past Medical History:   Diagnosis Date    GERD (gastroesophageal reflux disease)     History of anemia        Past Surgical History:   Procedure Laterality Date    ABLATION ENDOMETRIAL THERMAL - NOVASURE N/A 6/15/2018    [facility] by [facility] at [facility]    ADENOIDECTOMY  1987     SECTION      X4    HYSTEROSCOPY N/A 6/15/2018    Performed by [facility] at [facility]    TONSILLECTOMY, ADENOIDECTOMY      TUBAL LIGATION      with 4th     WISDOM TOOTH EXTRACTION         Current Outpatient Medications   Medication Sig Dispense Refill    ibuprofen (ADVIL,MOTRIN) 200 MG tablet Take 200 mg by mouth every 6 (six) hours as needed for Pain.       No current facility-administered medications for this visit.        Review of patient's allergies indicates:  No Known Allergies    Family History   Problem Relation Age of Onset    Hypertension Paternal Grandfather     Heart disease Maternal Grandmother     Diabetes type II Maternal Grandmother         Borderline    Heart disease Maternal Grandfather     Hypertension Maternal Grandfather     Emphysema Maternal Grandfather         Smoker    Hypertension Father     Heart disease Father     No Known Problems Mother     Breast cancer Neg Hx     Ovarian cancer Neg Hx        Social History     Tobacco Use    Smoking status: Never Smoker    Smokeless tobacco: Never Used   Substance Use Topics    Alcohol use: Yes     Alcohol/week:  "0.0 - 0.6 oz     Comment: Occasional glass of wine    Drug use: No       OB History    Para Term  AB Living   4 4 4     4   SAB TAB Ectopic Multiple Live Births           4      # Outcome Date GA Lbr Fernandez/2nd Weight Sex Delivery Anes PTL Lv   4 Term 13   3.43 kg (7 lb 9 oz) F CS-LTranv Spinal N BI      Birth Comments: System Generated. Please review and update pregnancy details.   3 Term 09 39w0d  3.912 kg (8 lb 10 oz) M CS-LTranv Spinal  BI      Birth Comments: spotted in begin, pelvic rest marginal plecenta resolved around 20 weeks   2 Term 07 39w0d  3.26 kg (7 lb 3 oz) F CS-LTranv EPI  BI      Birth Comments: no complications   1 Term 06 39w0d  3.204 kg (7 lb 1 oz) M CS-LTranv Spinal  BI      Birth Comments: nucol cord x 4       Review of Symptoms:  GENERAL: Denies weight gain or weight loss. Feeling well overall.   SKIN: Denies rash or lesions.   HEAD: Denies head injury or headache.   NODES: Denies enlarged lymph nodes.   CHEST: Denies chest pain or shortness of breath.   CARDIOVASCULAR: Denies palpitations or left sided chest pain.   ABDOMEN: No abdominal pain, constipation, diarrhea, nausea, vomiting or rectal bleeding.   URINARY: No frequency, dysuria, hematuria, or burning on urination.  HEMATOLOGIC: No easy bruisability or excessive bleeding.   MUSCULOSKELETAL: Denies joint pain or swelling.     /62   Ht 5' 6.5" (1.689 m)   Wt 61.6 kg (135 lb 12.9 oz)   LMP 2019   Physical Exam:  APPEARANCE: Well nourished, well developed, in no acute distress.  SKIN: Normal skin turgor, no lesions.  NECK: Neck symmetric without masses   RESPIRATORY: Normal respiratory effort with no retractions or use of accessory muscles  CARDIOVASCULAR: Peripheral vascular system with no swelling no varicosities and palpation of pulses normal  LYMPHATIC: No enlargements of the lymph nodes noted in the neck, axillae, or groin  ABDOMEN: Soft. No tenderness or masses. No " hepatosplenomegaly. No hernias.  PELVIC: Normal external female genitalia without lesions. Normal hair distribution. Adequate perineal body, normal urethral meatus. Urethra with no masses.  Bladder nontender. Vagina moist and well rugated without lesions or discharge. Cervix pink and without lesions. No significant cystocele or rectocele. Bimanual exam showed uterus normal size, shape, position, mobile and nontender. Adnexa without masses or tenderness. Urethra and bladder normal.  EXTREMITIES: No clubbing cyanosis or edema.    ASSESSMENT/PLAN:  Menorrhagia with irregular cycle    Uterine leiomyoma, unspecified location    Fibroids    Postcoital bleeding      We discussed definitive hysterectomy and she will consider. Would plan for TLHBS    15 minutes spent today with this patient. Greater than half spent in counseling today.

## 2020-01-17 ENCOUNTER — TELEPHONE (OUTPATIENT)
Dept: OBSTETRICS AND GYNECOLOGY | Facility: CLINIC | Age: 40
End: 2020-01-17

## 2020-01-17 NOTE — TELEPHONE ENCOUNTER
----- Message from Tiffany Schumacher sent at 1/17/2020 10:27 AM CST -----   Type:  Sooner Apoointment Request    Caller is requesting a sooner appointment.  Caller declined first available appointment listed below.  Caller will not accept being placed on the waitlist and is requesting a message be sent to doctor.    Name of Caller:  pt  When is the first available appointment?    Pt     Calling to  Book  surgery  Best Call Back Number: [phone]  Additional Information:    Calling to   Book

## 2020-01-17 NOTE — TELEPHONE ENCOUNTER
----- Message from Rubio Edge sent at 1/17/2020  4:04 PM CST -----  Contact: self   Placed call to pod, Patient miss call from your office please call back at [phone] (home)     Case number [id number]

## 2020-01-17 NOTE — TELEPHONE ENCOUNTER
Pt stated she had spoke with Dr. Faye back in September about having a hysterectomy and patient was calling to schedule. Pt notified she will be called as soon as possible to get her surgery scheduled. Pt voiced understanding.

## 2020-01-20 DIAGNOSIS — D25.9 UTERINE LEIOMYOMA, UNSPECIFIED LOCATION: Primary | ICD-10-CM

## 2020-01-20 DIAGNOSIS — N92.1 MENORRHAGIA WITH IRREGULAR CYCLE: ICD-10-CM

## 2020-01-20 DIAGNOSIS — N93.0 POSTCOITAL BLEEDING: ICD-10-CM

## 2020-02-20 ENCOUNTER — OFFICE VISIT (OUTPATIENT)
Dept: OBSTETRICS AND GYNECOLOGY | Facility: CLINIC | Age: 40
End: 2020-02-20
Payer: COMMERCIAL

## 2020-02-20 VITALS — WEIGHT: 140.63 LBS | BODY MASS INDEX: 22.07 KG/M2 | HEIGHT: 67 IN

## 2020-02-20 DIAGNOSIS — N92.1 MENORRHAGIA WITH IRREGULAR CYCLE: Primary | ICD-10-CM

## 2020-02-20 DIAGNOSIS — D21.9 FIBROIDS: ICD-10-CM

## 2020-02-20 DIAGNOSIS — D50.0 IRON DEFICIENCY ANEMIA DUE TO CHRONIC BLOOD LOSS: ICD-10-CM

## 2020-02-20 DIAGNOSIS — D25.9 UTERINE LEIOMYOMA, UNSPECIFIED LOCATION: ICD-10-CM

## 2020-02-20 DIAGNOSIS — N93.0 POSTCOITAL BLEEDING: ICD-10-CM

## 2020-02-20 PROCEDURE — 99499 UNLISTED E&M SERVICE: CPT | Mod: S$GLB,,, | Performed by: OBSTETRICS & GYNECOLOGY

## 2020-02-20 PROCEDURE — 99999 PR PBB SHADOW E&M-EST. PATIENT-LVL III: CPT | Mod: PBBFAC,,, | Performed by: OBSTETRICS & GYNECOLOGY

## 2020-02-20 PROCEDURE — 99499 NO LOS: ICD-10-PCS | Mod: S$GLB,,, | Performed by: OBSTETRICS & GYNECOLOGY

## 2020-02-20 PROCEDURE — 99999 PR PBB SHADOW E&M-EST. PATIENT-LVL III: ICD-10-PCS | Mod: PBBFAC,,, | Performed by: OBSTETRICS & GYNECOLOGY

## 2020-02-20 RX ORDER — SODIUM CHLORIDE, SODIUM LACTATE, POTASSIUM CHLORIDE, CALCIUM CHLORIDE 600; 310; 30; 20 MG/100ML; MG/100ML; MG/100ML; MG/100ML
INJECTION, SOLUTION INTRAVENOUS CONTINUOUS
Status: CANCELLED | OUTPATIENT
Start: 2020-02-20

## 2020-02-20 NOTE — PROGRESS NOTES
Chief Complaint   Patient presents with    Pre-op Exam    stph  7a tlh       History of Present Illness: Bharti Camejo is a 39 y.o. female that presents today 2020 for   Chief Complaint   Patient presents with    Pre-op Exam    stph  7a tlh         Past Medical History:   Diagnosis Date    GERD (gastroesophageal reflux disease)     History of anemia        Past Surgical History:   Procedure Laterality Date    ADENOIDECTOMY  1987     SECTION      X4    HYSTEROSCOPY N/A 6/15/2018    Procedure: HYSTEROSCOPY;  Surgeon: Radha Faye MD;  Location: [facility];  Service: OB/GYN;  Laterality: N/A;    THERMAL ABLATION OF ENDOMETRIUM USING HYSTEROSCOPY N/A 6/15/2018    Procedure: ABLATION ENDOMETRIAL THERMAL - NOVASURE;  Surgeon: Radha Faye MD;  Location: [facility];  Service: OB/GYN;  Laterality: N/A;    TONSILLECTOMY, ADENOIDECTOMY      TUBAL LIGATION      with 4th     WISDOM TOOTH EXTRACTION         Current Outpatient Medications   Medication Sig Dispense Refill    ibuprofen (ADVIL,MOTRIN) 200 MG tablet Take 200 mg by mouth every 6 (six) hours as needed for Pain.      [START ON 2020] chlorhexidine (PERIDEX) 0.12 % solution Use as directed 10 mLs in the mouth or throat 2 (two) times daily. START 2020      [START ON 2020] mupirocin (BACTROBAN) 2 % ointment 1 g by Nasal route 2 (two) times daily. START 2020       No current facility-administered medications for this visit.        Review of patient's allergies indicates:  No Known Allergies    Family History   Problem Relation Age of Onset    Hypertension Paternal Grandfather     Heart disease Maternal Grandmother     Diabetes type II Maternal Grandmother         Borderline    Heart disease Maternal Grandfather     Hypertension Maternal Grandfather     Emphysema Maternal Grandfather         Smoker    Hypertension Father     Heart disease Father     No Known Problems Mother      "Breast cancer Neg Hx     Ovarian cancer Neg Hx        Social History     Tobacco Use    Smoking status: Never Smoker    Smokeless tobacco: Never Used   Substance Use Topics    Alcohol use: Yes     Alcohol/week: 0.0 - 1.0 standard drinks     Comment: Occasional glass of wine    Drug use: No       OB History    Para Term  AB Living   4 4 4     4   SAB TAB Ectopic Multiple Live Births           4      # Outcome Date GA Lbr Fernandez/2nd Weight Sex Delivery Anes PTL Lv   4 Term 13   3.43 kg (7 lb 9 oz) F CS-LTranv Spinal N BI      Birth Comments: System Generated. Please review and update pregnancy details.   3 Term 09 39w0d  3.912 kg (8 lb 10 oz) M CS-LTranv Spinal  BI      Birth Comments: spotted in begin, pelvic rest marginal plecenta resolved around 20 weeks   2 Term 07 39w0d  3.26 kg (7 lb 3 oz) F CS-LTranv EPI  BI      Birth Comments: no complications   1 Term 06 39w0d  3.204 kg (7 lb 1 oz) M CS-LTranv Spinal  BI      Birth Comments: nucol cord x 4       Review of Symptoms:  GENERAL: Denies weight gain or weight loss. Feeling well overall.   SKIN: Denies rash or lesions.   HEAD: Denies head injury or headache.   NODES: Denies enlarged lymph nodes.   CHEST: Denies chest pain or shortness of breath.   CARDIOVASCULAR: Denies palpitations or left sided chest pain.   ABDOMEN: No abdominal pain, constipation, diarrhea, nausea, vomiting or rectal bleeding.   URINARY: No frequency, dysuria, hematuria, or burning on urination.  HEMATOLOGIC: No easy bruisability or excessive bleeding.   MUSCULOSKELETAL: Denies joint pain or swelling.     Ht 5' 7" (1.702 m)   Wt 63.8 kg (140 lb 10.5 oz)   LMP 2020   Physical Exam:  APPEARANCE: Well nourished, well developed, in no acute distress.  SKIN: Normal skin turgor, no lesions.  NECK: Neck symmetric without masses   RESPIRATORY: Normal respiratory effort with no retractions or use of accessory muscles  CARDIOVASCULAR: Peripheral " vascular system with no swelling no varicosities and palpation of pulses normal  LYMPHATIC: No enlargements of the lymph nodes noted in the neck, axillae, or groin  ABDOMEN: Soft. No tenderness or masses. No hepatosplenomegaly. No hernias.  PELVIC: Normal external female genitalia without lesions. Normal hair distribution. Adequate perineal body, normal urethral meatus. Urethra with no masses.  Bladder nontender. Vagina moist and well rugated without lesions or discharge. Cervix pink and without lesions. No significant cystocele or rectocele. Bimanual exam showed uterus normal size, shape, position, mobile and nontender. Adnexa without masses or tenderness. Urethra and bladder normal.  EXTREMITIES: No clubbing cyanosis or edema.    ASSESSMENT/PLAN:  Menorrhagia with irregular cycle  -     Full code; Standing  -     Insert peripheral IV; Standing  -     Chlorhexidine (CHG) 2% Wipes; Standing  -     Notify Physician - Potential Need of Opioid Reversal; Standing  -     Diet NPO; Standing  -     X-Ray Chest PA And Lateral; Standing    Uterine leiomyoma, unspecified location    Fibroids    Postcoital bleeding    Iron deficiency anemia due to chronic blood loss        Risks and benefits discussed for total laparoscopic hysterectomy including bleeding, infection, injury to bowel, bladdder, ureters, blood vessels, and nerves.  Risks of DVT, PE, anesthesia, death.  We discussed alternatives including do nothing or medical management.

## 2020-02-27 PROBLEM — D25.9 UTERINE LEIOMYOMA: Status: ACTIVE | Noted: 2020-02-27

## 2020-03-13 ENCOUNTER — OFFICE VISIT (OUTPATIENT)
Dept: OBSTETRICS AND GYNECOLOGY | Facility: CLINIC | Age: 40
End: 2020-03-13
Payer: COMMERCIAL

## 2020-03-13 VITALS — BODY MASS INDEX: 21.8 KG/M2 | WEIGHT: 138.88 LBS | HEIGHT: 67 IN

## 2020-03-13 DIAGNOSIS — Z09 POSTOP CHECK: Primary | ICD-10-CM

## 2020-03-13 PROCEDURE — 99999 PR PBB SHADOW E&M-EST. PATIENT-LVL III: ICD-10-PCS | Mod: PBBFAC,,, | Performed by: OBSTETRICS & GYNECOLOGY

## 2020-03-13 PROCEDURE — 99999 PR PBB SHADOW E&M-EST. PATIENT-LVL III: CPT | Mod: PBBFAC,,, | Performed by: OBSTETRICS & GYNECOLOGY

## 2020-03-13 PROCEDURE — 99024 POSTOP FOLLOW-UP VISIT: CPT | Mod: S$GLB,,, | Performed by: OBSTETRICS & GYNECOLOGY

## 2020-03-13 PROCEDURE — 99024 PR POST-OP FOLLOW-UP VISIT: ICD-10-PCS | Mod: S$GLB,,, | Performed by: OBSTETRICS & GYNECOLOGY

## 2020-03-13 NOTE — PROGRESS NOTES
POST-OP NOTE    3/13/2020    HPI: no complaints    Past Medical History:   Diagnosis Date    GERD (gastroesophageal reflux disease)     History of anemia      Past Surgical History:   Procedure Laterality Date    ADENOIDECTOMY  1987     SECTION      X4    CYSTOSCOPY N/A 2020    Procedure: CYSTOSCOPY;  Surgeon: Radha Faye MD;  Location: [facility];  Service: OB/GYN;  Laterality: N/A;    HYSTERECTOMY      HYSTEROSCOPY N/A 6/15/2018    Procedure: HYSTEROSCOPY;  Surgeon: Radha Faye MD;  Location: [facility];  Service: OB/GYN;  Laterality: N/A;    LAPAROSCOPIC LYSIS OF ADHESIONS N/A 2020    Procedure: LYSIS, ADHESIONS, LAPAROSCOPIC;  Surgeon: Radha Faye MD;  Location: [facility];  Service: OB/GYN;  Laterality: N/A;    LAPAROSCOPIC SALPINGECTOMY Bilateral 2020    Procedure: SALPINGECTOMY, LAPAROSCOPIC;  Surgeon: Radha Faye MD;  Location: [facility];  Service: OB/GYN;  Laterality: Bilateral;    LAPAROSCOPIC TOTAL HYSTERECTOMY N/A 2020    Procedure: HYSTERECTOMY, TOTAL, LAPAROSCOPIC;  Surgeon: Radha Faye MD;  Location: [facility];  Service: OB/GYN;  Laterality: N/A;    THERMAL ABLATION OF ENDOMETRIUM USING HYSTEROSCOPY N/A 6/15/2018    Procedure: ABLATION ENDOMETRIAL THERMAL - NOVASURE;  Surgeon: Radha Faye MD;  Location: [facility];  Service: OB/GYN;  Laterality: N/A;    TONSILLECTOMY, ADENOIDECTOMY      TUBAL LIGATION      with 4th     WISDOM TOOTH EXTRACTION       Current Outpatient Medications   Medication Sig Dispense Refill    ibuprofen (ADVIL,MOTRIN) 600 MG tablet Take 1 tablet (600 mg total) by mouth every 6 (six) hours. (Patient not taking: Reported on 3/13/2020) 30 tablet 1    oxyCODONE-acetaminophen (PERCOCET) 5-325 mg per tablet Take 1 tablet by mouth every 4 (four) hours as needed. 30 tablet 0     No current facility-administered medications for this visit.      Review of patient's allergies indicates:  No  "Known Allergies  Social History     Tobacco Use    Smoking status: Never Smoker    Smokeless tobacco: Never Used   Substance Use Topics    Alcohol use: Yes     Alcohol/week: 0.0 - 1.0 standard drinks     Comment: Occasional glass of wine    Drug use: No     Ht 5' 7" (1.702 m)   Wt 63 kg (138 lb 14.2 oz)   LMP 02/25/2020     ROS:  GENERAL: Denies weight gain, weight loss, fatigue, and malaise.   SKIN: Denies rash or lesions.   HEAD: Denies head injury or headache.   NODES: Denies enlarged lymph nodes.   CHEST: Denies chest pain or shortness of breath.   CARDIOVASCULAR: Denies palpitations or left sided chest pain.   ABDOMEN: No abdominal pain, constipation, diarrhea, nausea, vomiting or rectal bleeding.   URINARY: No frequency, urgency, dysuria, or hematuria  MUSCULOSKELETAL: Denies joint pain or swelling.   NEUROLOGIC: Denies seizures.    PE:   APPEARANCE: Well nourished, well developed, in no acute distress.  SKIN: Normal skin turgor, no lesions.  ABDOMEN: Incision healing fine. Soft. No tenderness or masses. No hepatosplenomegaly. No hernias.    ASSESSMENT  Encounter Diagnoses   Name Primary?    Postop check Yes       PLAN  1. RTC:4 weeks   "

## 2020-04-13 ENCOUNTER — OFFICE VISIT (OUTPATIENT)
Dept: OBSTETRICS AND GYNECOLOGY | Facility: CLINIC | Age: 40
End: 2020-04-13
Payer: COMMERCIAL

## 2020-04-13 ENCOUNTER — PATIENT MESSAGE (OUTPATIENT)
Dept: OBSTETRICS AND GYNECOLOGY | Facility: CLINIC | Age: 40
End: 2020-04-13

## 2020-04-13 DIAGNOSIS — Z09 POSTOP CHECK: Primary | ICD-10-CM

## 2020-04-13 PROCEDURE — 99024 POSTOP FOLLOW-UP VISIT: CPT | Mod: 95,,, | Performed by: OBSTETRICS & GYNECOLOGY

## 2020-04-13 PROCEDURE — 99024 PR POST-OP FOLLOW-UP VISIT: ICD-10-PCS | Mod: 95,,, | Performed by: OBSTETRICS & GYNECOLOGY

## 2020-04-13 NOTE — PROGRESS NOTES
The patient location is: home  The chief complaint leading to consultation is:   Chief Complaint   Patient presents with    Post-op Evaluation     Visit type: Virtual visit with synchronous audio and video  Total time spent with patient: 15  Each patient to whom he or she provides medical services by telemedicine is:  (1) informed of the relationship between the physician and patient and the respective role of any other health care provider with respect to management of the patient; and (2) notified that he or she may decline to receive medical services by telemedicine and may withdraw from such care at any time.    15 minutes spent today with this patient. Greater than half spent in counseling today.     Chief Complaint   Patient presents with    Post-op Evaluation       History of Present Illness: Bharti Camejo is a 39 y.o. female that presents today 2020 for   Chief Complaint   Patient presents with    Post-op Evaluation         Past Medical History:   Diagnosis Date    GERD (gastroesophageal reflux disease)     History of anemia        Past Surgical History:   Procedure Laterality Date    ADENOIDECTOMY  1987     SECTION      X4    CYSTOSCOPY N/A 2020    Procedure: CYSTOSCOPY;  Surgeon: Radha Faye MD;  Location: [facility];  Service: OB/GYN;  Laterality: N/A;    HYSTERECTOMY      HYSTEROSCOPY N/A 6/15/2018    Procedure: HYSTEROSCOPY;  Surgeon: Radha Faye MD;  Location: [facility];  Service: OB/GYN;  Laterality: N/A;    LAPAROSCOPIC LYSIS OF ADHESIONS N/A 2020    Procedure: LYSIS, ADHESIONS, LAPAROSCOPIC;  Surgeon: Radha Faye MD;  Location: [facility] OR;  Service: OB/GYN;  Laterality: N/A;    LAPAROSCOPIC SALPINGECTOMY Bilateral 2020    Procedure: SALPINGECTOMY, LAPAROSCOPIC;  Surgeon: Radha Faye MD;  Location: [facility] OR;  Service: OB/GYN;  Laterality: Bilateral;    LAPAROSCOPIC TOTAL HYSTERECTOMY N/A 2020    Procedure:  HYSTERECTOMY, TOTAL, LAPAROSCOPIC;  Surgeon: Radha Faye MD;  Location: [facility] OR;  Service: OB/GYN;  Laterality: N/A;    THERMAL ABLATION OF ENDOMETRIUM USING HYSTEROSCOPY N/A 6/15/2018    Procedure: ABLATION ENDOMETRIAL THERMAL - NOVASURE;  Surgeon: Radha Faye MD;  Location: [facility];  Service: OB/GYN;  Laterality: N/A;    TONSILLECTOMY, ADENOIDECTOMY      TUBAL LIGATION      with 4th     WISDOM TOOTH EXTRACTION         Current Outpatient Medications   Medication Sig Dispense Refill    ibuprofen (ADVIL,MOTRIN) 600 MG tablet Take 1 tablet (600 mg total) by mouth every 6 (six) hours. (Patient not taking: Reported on 3/13/2020) 30 tablet 1    oxyCODONE-acetaminophen (PERCOCET) 5-325 mg per tablet Take 1 tablet by mouth every 4 (four) hours as needed. 30 tablet 0     No current facility-administered medications for this visit.        Review of patient's allergies indicates:  No Known Allergies    Family History   Problem Relation Age of Onset    Hypertension Paternal Grandfather     Heart disease Maternal Grandmother     Diabetes type II Maternal Grandmother         Borderline    Heart disease Maternal Grandfather     Hypertension Maternal Grandfather     Emphysema Maternal Grandfather         Smoker    Hypertension Father     Heart disease Father     No Known Problems Mother     Breast cancer Neg Hx     Ovarian cancer Neg Hx        Social History     Tobacco Use    Smoking status: Never Smoker    Smokeless tobacco: Never Used   Substance Use Topics    Alcohol use: Yes     Alcohol/week: 0.0 - 1.0 standard drinks     Comment: Occasional glass of wine    Drug use: No       OB History    Para Term  AB Living   4 4 4     4   SAB TAB Ectopic Multiple Live Births           4      # Outcome Date GA Lbr Fernandez/2nd Weight Sex Delivery Anes PTL Lv   4 Term 13   3.43 kg (7 lb 9 oz) F CS-LTranv Spinal N BI      Birth Comments: System Generated. Please review and  update pregnancy details.   3 Term 07/07/09 39w0d  3.912 kg (8 lb 10 oz) M CS-LTranv Spinal  BI      Birth Comments: spotted in begin, pelvic rest marginal plecenta resolved around 20 weeks   2 Term 09/24/07 39w0d  3.26 kg (7 lb 3 oz) F CS-LTranv EPI  BI      Birth Comments: no complications   1 Term 04/12/06 39w0d  3.204 kg (7 lb 1 oz) M CS-LTranv Spinal  BI      Birth Comments: nucol cord x 4       Review of Symptoms:  GENERAL: Denies weight gain or weight loss. Feeling well overall.   SKIN: Denies rash or lesions.   HEAD: Denies head injury or headache.   NODES: Denies enlarged lymph nodes.   CHEST: Denies chest pain or shortness of breath.   CARDIOVASCULAR: Denies palpitations or left sided chest pain.   ABDOMEN: No abdominal pain, constipation, diarrhea, nausea, vomiting or rectal bleeding.   URINARY: No frequency, dysuria, hematuria, or burning on urination.  HEMATOLOGIC: No easy bruisability or excessive bleeding.   MUSCULOSKELETAL: Denies joint pain or swelling.     LMP 02/25/2020     ASSESSMENT/PLAN:  Postop check      4 weeks cuff check  Precautions discussed.     COVID-19 Discussion:     I had long discussion with patient and any applicable family about the COVID-19 coronavirus epidemic and the recommended precautions. I have re-iterated the CDC recommendations for adequate hand washing, use of hand -like products, and coughing into elbow, etc. In addition, especially for immunocompromised patients, I have recommended avoidance of crowds, including movie theaters, restaurants, churches, etc. I have recommended avoidance of any sick or symptomatic family members and/or friends. The patient has been asked to call us immediately with any symptom developments, issues, questions or other general concerns.

## 2020-05-08 ENCOUNTER — OFFICE VISIT (OUTPATIENT)
Dept: OBSTETRICS AND GYNECOLOGY | Facility: CLINIC | Age: 40
End: 2020-05-08
Payer: COMMERCIAL

## 2020-05-08 VITALS — WEIGHT: 142.19 LBS | HEIGHT: 67 IN | BODY MASS INDEX: 22.32 KG/M2

## 2020-05-08 DIAGNOSIS — Z20.822 CLOSE EXPOSURE TO COVID-19 VIRUS: ICD-10-CM

## 2020-05-08 DIAGNOSIS — Z12.39 BREAST CANCER SCREENING: ICD-10-CM

## 2020-05-08 DIAGNOSIS — Z09 POSTOP CHECK: Primary | ICD-10-CM

## 2020-05-08 PROCEDURE — 99999 PR PBB SHADOW E&M-EST. PATIENT-LVL III: CPT | Mod: PBBFAC,,, | Performed by: OBSTETRICS & GYNECOLOGY

## 2020-05-08 PROCEDURE — 99024 PR POST-OP FOLLOW-UP VISIT: ICD-10-PCS | Mod: S$GLB,,, | Performed by: OBSTETRICS & GYNECOLOGY

## 2020-05-08 PROCEDURE — 99024 POSTOP FOLLOW-UP VISIT: CPT | Mod: S$GLB,,, | Performed by: OBSTETRICS & GYNECOLOGY

## 2020-05-08 PROCEDURE — 99999 PR PBB SHADOW E&M-EST. PATIENT-LVL III: ICD-10-PCS | Mod: PBBFAC,,, | Performed by: OBSTETRICS & GYNECOLOGY

## 2020-05-08 NOTE — PROGRESS NOTES
POST-OP NOTE    2020    HPI: no complaints    Past Medical History:   Diagnosis Date    GERD (gastroesophageal reflux disease)     History of anemia      Past Surgical History:   Procedure Laterality Date    ADENOIDECTOMY  1987     SECTION      X4    CYSTOSCOPY N/A 2020    Procedure: CYSTOSCOPY;  Surgeon: Radha Faye MD;  Location: [facility];  Service: OB/GYN;  Laterality: N/A;    HYSTERECTOMY      HYSTEROSCOPY N/A 6/15/2018    Procedure: HYSTEROSCOPY;  Surgeon: Radha Faye MD;  Location: [facility];  Service: OB/GYN;  Laterality: N/A;    LAPAROSCOPIC LYSIS OF ADHESIONS N/A 2020    Procedure: LYSIS, ADHESIONS, LAPAROSCOPIC;  Surgeon: Radha Faye MD;  Location: [facility];  Service: OB/GYN;  Laterality: N/A;    LAPAROSCOPIC SALPINGECTOMY Bilateral 2020    Procedure: SALPINGECTOMY, LAPAROSCOPIC;  Surgeon: Radha Faye MD;  Location: [facility];  Service: OB/GYN;  Laterality: Bilateral;    LAPAROSCOPIC TOTAL HYSTERECTOMY N/A 2020    Procedure: HYSTERECTOMY, TOTAL, LAPAROSCOPIC;  Surgeon: Radha Faye MD;  Location: [facility];  Service: OB/GYN;  Laterality: N/A;    THERMAL ABLATION OF ENDOMETRIUM USING HYSTEROSCOPY N/A 6/15/2018    Procedure: ABLATION ENDOMETRIAL THERMAL - NOVASURE;  Surgeon: Radha Faye MD;  Location: [facility];  Service: OB/GYN;  Laterality: N/A;    TONSILLECTOMY, ADENOIDECTOMY      TUBAL LIGATION      with 4th     WISDOM TOOTH EXTRACTION       Current Outpatient Medications   Medication Sig Dispense Refill    ibuprofen (ADVIL,MOTRIN) 600 MG tablet Take 1 tablet (600 mg total) by mouth every 6 (six) hours. 30 tablet 1    oxyCODONE-acetaminophen (PERCOCET) 5-325 mg per tablet Take 1 tablet by mouth every 4 (four) hours as needed. 30 tablet 0     No current facility-administered medications for this visit.      Review of patient's allergies indicates:  No Known Allergies  Social History     Tobacco Use  "   Smoking status: Never Smoker    Smokeless tobacco: Never Used   Substance Use Topics    Alcohol use: Yes     Alcohol/week: 0.0 - 1.0 standard drinks     Comment: Occasional glass of wine    Drug use: No     Ht 5' 7" (1.702 m)   Wt 64.5 kg (142 lb 3.2 oz)   LMP 02/25/2020     ROS:  GENERAL: Denies weight gain, weight loss, fatigue, and malaise.   SKIN: Denies rash or lesions.   HEAD: Denies head injury or headache.   NODES: Denies enlarged lymph nodes.   CHEST: Denies chest pain or shortness of breath.   CARDIOVASCULAR: Denies palpitations or left sided chest pain.   ABDOMEN: No abdominal pain, constipation, diarrhea, nausea, vomiting or rectal bleeding.   URINARY: No frequency, urgency, dysuria, or hematuria  MUSCULOSKELETAL: Denies joint pain or swelling.   NEUROLOGIC: Denies seizures.    PE:   APPEARANCE: Well nourished, well developed, in no acute distress.  SKIN: Normal skin turgor, no lesions.  ABDOMEN: Incision healing fine. Soft. No tenderness or masses. No hepatosplenomegaly. No hernias.  PELVIC: Normal external female genitalia without lesions. Normal hair distribution. Adequate perineal body, normal urethral meatus. Normal palpable bladder and urethra. Vagina moist and well rugated without lesions or discharge.   EXTREMITIES: NO clubbing cyanosis or edema    ASSESSMENT  Encounter Diagnoses   Name Primary?    Postop check Yes    Close Exposure to Covid-19 Virus     Breast cancer screening        PLAN  1. RTC:prn  "

## 2020-05-18 ENCOUNTER — PATIENT MESSAGE (OUTPATIENT)
Dept: OBSTETRICS AND GYNECOLOGY | Facility: CLINIC | Age: 40
End: 2020-05-18

## 2020-05-19 ENCOUNTER — CLINICAL SUPPORT (OUTPATIENT)
Dept: OBSTETRICS AND GYNECOLOGY | Facility: CLINIC | Age: 40
End: 2020-05-19
Payer: COMMERCIAL

## 2020-05-19 DIAGNOSIS — R39.9 UTI SYMPTOMS: Primary | ICD-10-CM

## 2020-05-19 PROCEDURE — 87186 SC STD MICRODIL/AGAR DIL: CPT

## 2020-05-19 PROCEDURE — 87077 CULTURE AEROBIC IDENTIFY: CPT

## 2020-05-19 PROCEDURE — 87088 URINE BACTERIA CULTURE: CPT

## 2020-05-19 PROCEDURE — 87086 URINE CULTURE/COLONY COUNT: CPT

## 2020-05-22 LAB — BACTERIA UR CULT: ABNORMAL

## 2020-08-17 ENCOUNTER — HOSPITAL ENCOUNTER (OUTPATIENT)
Dept: RADIOLOGY | Facility: HOSPITAL | Age: 40
Discharge: HOME | End: 2020-08-17
Attending: OBSTETRICS & GYNECOLOGY
Payer: COMMERCIAL

## 2020-08-17 DIAGNOSIS — Z12.31 SCREENING MAMMOGRAM, ENCOUNTER FOR: ICD-10-CM

## 2020-08-17 PROCEDURE — 77067 SCR MAMMO BI INCL CAD: CPT | Mod: TC,PN

## 2020-08-17 PROCEDURE — 77063 BREAST TOMOSYNTHESIS BI: CPT | Mod: 26,,, | Performed by: RADIOLOGY

## 2020-08-17 PROCEDURE — 77063 MAMMO DIGITAL SCREENING BILAT WITH TOMOSYNTHESIS_CAD: ICD-10-PCS | Mod: 26,,, | Performed by: RADIOLOGY

## 2020-08-17 PROCEDURE — 77067 SCR MAMMO BI INCL CAD: CPT | Mod: 26,,, | Performed by: RADIOLOGY

## 2020-08-17 PROCEDURE — 77067 MAMMO DIGITAL SCREENING BILAT WITH TOMOSYNTHESIS_CAD: ICD-10-PCS | Mod: 26,,, | Performed by: RADIOLOGY

## 2020-10-05 ENCOUNTER — PATIENT MESSAGE (OUTPATIENT)
Dept: ADMINISTRATIVE | Facility: HOSPITAL | Age: 40
End: 2020-10-05

## 2020-10-14 ENCOUNTER — OFFICE VISIT (OUTPATIENT)
Dept: FAMILY MEDICINE | Facility: CLINIC | Age: 40
End: 2020-10-14
Payer: COMMERCIAL

## 2020-10-14 VITALS
WEIGHT: 149.69 LBS | RESPIRATION RATE: 18 BRPM | OXYGEN SATURATION: 99 % | TEMPERATURE: 99 F | BODY MASS INDEX: 23.49 KG/M2 | HEART RATE: 94 BPM | SYSTOLIC BLOOD PRESSURE: 128 MMHG | DIASTOLIC BLOOD PRESSURE: 72 MMHG | HEIGHT: 67 IN

## 2020-10-14 DIAGNOSIS — N39.0 RECURRENT UTI: ICD-10-CM

## 2020-10-14 DIAGNOSIS — L70.0 ACNE VULGARIS: ICD-10-CM

## 2020-10-14 DIAGNOSIS — Z00.00 PREVENTATIVE HEALTH CARE: Primary | ICD-10-CM

## 2020-10-14 LAB
BILIRUB SERPL-MCNC: NORMAL MG/DL
BLOOD URINE, POC: NORMAL
CLARITY, POC UA: CLEAR
COLOR, POC UA: NORMAL
GLUCOSE UR QL STRIP: NORMAL
KETONES UR QL STRIP: NORMAL
LEUKOCYTE ESTERASE URINE, POC: NORMAL
NITRITE, POC UA: NORMAL
PH, POC UA: 6
PROTEIN, POC: NORMAL
SPECIFIC GRAVITY, POC UA: 1.02
UROBILINOGEN, POC UA: NORMAL

## 2020-10-14 PROCEDURE — 81002 URINALYSIS NONAUTO W/O SCOPE: CPT | Mod: S$GLB,,, | Performed by: INTERNAL MEDICINE

## 2020-10-14 PROCEDURE — 99999 PR PBB SHADOW E&M-EST. PATIENT-LVL IV: CPT | Mod: PBBFAC,,, | Performed by: INTERNAL MEDICINE

## 2020-10-14 PROCEDURE — 3008F PR BODY MASS INDEX (BMI) DOCUMENTED: ICD-10-PCS | Mod: CPTII,S$GLB,, | Performed by: INTERNAL MEDICINE

## 2020-10-14 PROCEDURE — 99396 PR PREVENTIVE VISIT,EST,40-64: ICD-10-PCS | Mod: 25,S$GLB,, | Performed by: INTERNAL MEDICINE

## 2020-10-14 PROCEDURE — 3008F BODY MASS INDEX DOCD: CPT | Mod: CPTII,S$GLB,, | Performed by: INTERNAL MEDICINE

## 2020-10-14 PROCEDURE — 99999 PR PBB SHADOW E&M-EST. PATIENT-LVL IV: ICD-10-PCS | Mod: PBBFAC,,, | Performed by: INTERNAL MEDICINE

## 2020-10-14 PROCEDURE — 81002 POCT URINE DIPSTICK WITHOUT MICROSCOPE: ICD-10-PCS | Mod: S$GLB,,, | Performed by: INTERNAL MEDICINE

## 2020-10-14 PROCEDURE — 99396 PREV VISIT EST AGE 40-64: CPT | Mod: 25,S$GLB,, | Performed by: INTERNAL MEDICINE

## 2020-10-14 RX ORDER — NITROFURANTOIN (MACROCRYSTALS) 100 MG/1
100 CAPSULE ORAL DAILY PRN
Qty: 10 CAPSULE | Refills: 2 | Status: SHIPPED | OUTPATIENT
Start: 2020-10-14 | End: 2022-04-05

## 2020-10-14 RX ORDER — SPIRONOLACTONE 25 MG/1
50 TABLET ORAL DAILY
Qty: 60 TABLET | Refills: 2 | Status: SHIPPED | OUTPATIENT
Start: 2020-10-14 | End: 2022-04-05

## 2020-10-14 RX ORDER — TRETINOIN 0.5 MG/G
CREAM TOPICAL NIGHTLY
Qty: 45 G | Refills: 1 | Status: SHIPPED | OUTPATIENT
Start: 2020-10-14 | End: 2020-10-19 | Stop reason: SDUPTHER

## 2020-10-14 NOTE — PROGRESS NOTES
Assessment and Plan:    1. Preventative health care  Discussed age appropriate preventative healthcare items including avoidance of tobacco, excessive alcohol consumption, and illicit drugs. Discussed safe sex practices. Discussed appropriate use of sunscreen, seatbelts, etc. Discussed maintenance of a healthy weight.   Declines flu today, planning to get this at work  - Comprehensive Metabolic Panel; Future  - CBC auto differential; Future  - Lipid Panel; Future  - TSH; Future  - Hepatitis C Antibody; Future    2. Acne vulgaris  Discussed options and would like to start spironolactone and retin-a. Discussed how to use this. Let me know if spironolactone is helping, would repeat BMP in 2-3 months if continuing this.  - spironolactone (ALDACTONE) 25 MG tablet; Take 2 tablets (50 mg total) by mouth once daily.  Dispense: 60 tablet; Refill: 2  - tretinoin (RETIN-A) 0.05 % cream; Apply topically every evening.  Dispense: 45 g; Refill: 1    3. Recurrent UTI  - POCT urine dipstick without microscope  - nitrofurantoin (MACRODANTIN) 100 MG capsule; Take 1 capsule (100 mg total) by mouth daily as needed (post-coital UTI prophylaxis).  Dispense: 10 capsule; Refill: 2    ______________________________________________________________________  Subjective:    Chief Complaint:  Annual    HPI:  Bharti is a 40 y.o. year old female here for annual exam.     She had a hysterectomy without oophorectomy in February of this year. Since this she has been having more cystic acne on her lower face and is concerned that this could be hormonal. She has been gaining weight as well. She also reports that her diet has not been as good lately. Has not been getting as much exercise.   She had been prescribed spironolactone in the past by her dermatologist but did not really take it. She had also been prescribed retin-A by her dermatologist but did not really take it for long.   She reports that she has a history of recurrent UTIs triggered by  "sexual activity. In the past she has been on post-coital UTI ppx which had worked well.       Medications:  Current Outpatient Medications on File Prior to Visit   Medication Sig Dispense Refill    ibuprofen (ADVIL,MOTRIN) 600 MG tablet Take 1 tablet (600 mg total) by mouth every 6 (six) hours. 30 tablet 1    [DISCONTINUED] nitrofurantoin, macrocrystal-monohydrate, (MACROBID) 100 MG capsule Take 1 capsule (100 mg total) by mouth 2 (two) times daily. (Patient not taking: Reported on 10/14/2020) 14 capsule 0    [DISCONTINUED] oxyCODONE-acetaminophen (PERCOCET) 5-325 mg per tablet Take 1 tablet by mouth every 4 (four) hours as needed. (Patient not taking: Reported on 10/14/2020) 30 tablet 0     No current facility-administered medications on file prior to visit.        Review of Systems:  Review of Systems   Constitutional: Negative for activity change and unexpected weight change.   HENT: Negative for hearing loss, rhinorrhea and trouble swallowing.    Eyes: Negative for discharge and visual disturbance.   Respiratory: Negative for chest tightness and wheezing.    Cardiovascular: Negative for chest pain and palpitations.   Gastrointestinal: Negative for blood in stool, constipation, diarrhea and vomiting.   Endocrine: Negative for polydipsia and polyuria.   Genitourinary: Negative for difficulty urinating, dysuria, hematuria and menstrual problem.   Musculoskeletal: Negative for arthralgias, joint swelling and neck pain.   Neurological: Negative for weakness and headaches.   Psychiatric/Behavioral: Negative for confusion and dysphoric mood.       Past Medical History:  Past Medical History:   Diagnosis Date    GERD (gastroesophageal reflux disease)     History of anemia        Objective:    Vitals:  Vitals:    10/14/20 1341   BP: 128/72   Pulse: 94   Resp: 18   Temp: 98.8 °F (37.1 °C)   TempSrc: Temporal   SpO2: 99%   Weight: 67.9 kg (149 lb 11.1 oz)   Height: 5' 7" (1.702 m)   PainSc: 0-No pain       Physical " Exam  Vitals signs reviewed.   Constitutional:       General: She is not in acute distress.     Appearance: She is well-developed. She is not diaphoretic.   HENT:      Mouth/Throat:      Pharynx: No oropharyngeal exudate.   Eyes:      General: No scleral icterus.        Right eye: No discharge.         Left eye: No discharge.      Conjunctiva/sclera: Conjunctivae normal.   Neck:      Musculoskeletal: Neck supple.      Thyroid: No thyromegaly.      Trachea: No tracheal deviation.   Cardiovascular:      Rate and Rhythm: Normal rate and regular rhythm.      Heart sounds: Normal heart sounds. No murmur.   Pulmonary:      Effort: Pulmonary effort is normal. No respiratory distress.      Breath sounds: Normal breath sounds. No wheezing or rales.   Abdominal:      General: Bowel sounds are normal. There is no distension.      Palpations: Abdomen is soft.      Tenderness: There is no abdominal tenderness.      Hernia: No hernia is present.   Lymphadenopathy:      Cervical: No cervical adenopathy.   Skin:     General: Skin is warm and dry.   Neurological:      Mental Status: She is alert and oriented to person, place, and time.   Psychiatric:         Behavior: Behavior normal.         Judgment: Judgment normal.         Data:    POCT UA neg nitrite, neg leuk, normal    Barbara Hall MD  Internal Medicine

## 2020-10-14 NOTE — PATIENT INSTRUCTIONS
Treatment of Acne:     Skin Care:  - Wash skin no more than twice a day using a non-comedogenic cleanser and warm water  - Do not pick or squeeze pimples as this can cause swelling, scarring, and infection  - Use a non-comedogenic moisturizer that does not contain dyes or perfumes (good brands: Cerave, Cetaphil)     Medications:  - Over the counter benzoyl peroxide (at least 10%) or over the counter salicylic acid (less effective than benzoyl peroxide) products can be very helpful in treating acne  - Aqua Glycolic toner can be very helpful for people with acne and oily skin  - You can use these over the counter products along with medications that have been prescribed to you  - If you have been prescribed tretinoin, this needs to be used in a specific way:    - Use only a pea sized amount to cover your entire face    - Use this at night (light deactivates the medication) after washing your face    - Start using this only 3 times a week for one week, then every other day for one week, then every day    - If you develop irritation or significant dryness, decrease back to every other day, and use additional moisturizer    - Do not use tretinoin at the same time as benzoyl peroxide, but you can continue to use the benzoyl peroxide in the morning  - If you have been prescribed a topical antibiotic, use this after washing your face in the morning  - If you have been prescribed a pill for acne, you can continue to use over the counter topical medications as long as you do not develop irritation     Any treatment for acne will take 3-4 months of continuous use to work. If you do not see an improvement in your acne after this time, let your doctor know. You may need to see a Dermatologist to consider additional treatment.     For More Information:  [email or web address].com/contents/[email or web address]-basics

## 2020-10-19 ENCOUNTER — PATIENT MESSAGE (OUTPATIENT)
Dept: FAMILY MEDICINE | Facility: CLINIC | Age: 40
End: 2020-10-19

## 2020-10-19 DIAGNOSIS — L70.0 ACNE VULGARIS: ICD-10-CM

## 2020-10-19 RX ORDER — TRETINOIN 0.5 MG/G
CREAM TOPICAL NIGHTLY
Qty: 45 G | Refills: 1 | Status: SHIPPED | OUTPATIENT
Start: 2020-10-19 | End: 2022-04-05 | Stop reason: SDUPTHER

## 2020-10-19 NOTE — TELEPHONE ENCOUNTER
See pt Mychart message. Tretinoin medication cost is cheaper at the local [facility] pharmacy. Pt requesting rx go to that pharmacy vs [facility]. Please review and advise.     LOV 10/14/20  Last refill 10/14/20 (to [facility])

## 2020-10-22 ENCOUNTER — LAB VISIT (OUTPATIENT)
Dept: LAB | Facility: HOSPITAL | Age: 40
End: 2020-10-22
Attending: INTERNAL MEDICINE
Payer: COMMERCIAL

## 2020-10-22 DIAGNOSIS — Z00.00 PREVENTATIVE HEALTH CARE: ICD-10-CM

## 2020-10-22 LAB
ALBUMIN SERPL BCP-MCNC: 4.2 G/DL (ref 3.5–5.2)
ALP SERPL-CCNC: 53 U/L (ref 55–135)
ALT SERPL W/O P-5'-P-CCNC: 13 U/L (ref 10–44)
ANION GAP SERPL CALC-SCNC: 6 MMOL/L (ref 8–16)
AST SERPL-CCNC: 15 U/L (ref 10–40)
BASOPHILS # BLD AUTO: 0.05 K/UL (ref 0–0.2)
BASOPHILS NFR BLD: 1 % (ref 0–1.9)
BILIRUB SERPL-MCNC: 1.1 MG/DL (ref 0.1–1)
BUN SERPL-MCNC: 13 MG/DL (ref 6–20)
CALCIUM SERPL-MCNC: 9 MG/DL (ref 8.7–10.5)
CHLORIDE SERPL-SCNC: 106 MMOL/L (ref 95–110)
CHOLEST SERPL-MCNC: 171 MG/DL (ref 120–199)
CHOLEST/HDLC SERPL: 3.4 {RATIO} (ref 2–5)
CO2 SERPL-SCNC: 26 MMOL/L (ref 23–29)
CREAT SERPL-MCNC: 0.9 MG/DL (ref 0.5–1.4)
DIFFERENTIAL METHOD: ABNORMAL
EOSINOPHIL # BLD AUTO: 0.2 K/UL (ref 0–0.5)
EOSINOPHIL NFR BLD: 3.9 % (ref 0–8)
ERYTHROCYTE [DISTWIDTH] IN BLOOD BY AUTOMATED COUNT: 12.6 % (ref 11.5–14.5)
EST. GFR  (AFRICAN AMERICAN): >60 ML/MIN/1.73 M^2
EST. GFR  (NON AFRICAN AMERICAN): >60 ML/MIN/1.73 M^2
GLUCOSE SERPL-MCNC: 87 MG/DL (ref 70–110)
HCT VFR BLD AUTO: 42.2 % (ref 37–48.5)
HDLC SERPL-MCNC: 50 MG/DL (ref 40–75)
HDLC SERPL: 29.2 % (ref 20–50)
HGB BLD-MCNC: 13.1 G/DL (ref 12–16)
IMM GRANULOCYTES # BLD AUTO: 0.01 K/UL (ref 0–0.04)
IMM GRANULOCYTES NFR BLD AUTO: 0.2 % (ref 0–0.5)
LDLC SERPL CALC-MCNC: 109.4 MG/DL (ref 63–159)
LYMPHOCYTES # BLD AUTO: 1.7 K/UL (ref 1–4.8)
LYMPHOCYTES NFR BLD: 33.9 % (ref 18–48)
MCH RBC QN AUTO: 29.2 PG (ref 27–31)
MCHC RBC AUTO-ENTMCNC: 31 G/DL (ref 32–36)
MCV RBC AUTO: 94 FL (ref 82–98)
MONOCYTES # BLD AUTO: 0.4 K/UL (ref 0.3–1)
MONOCYTES NFR BLD: 8.4 % (ref 4–15)
NEUTROPHILS # BLD AUTO: 2.7 K/UL (ref 1.8–7.7)
NEUTROPHILS NFR BLD: 52.6 % (ref 38–73)
NONHDLC SERPL-MCNC: 121 MG/DL
NRBC BLD-RTO: 0 /100 WBC
PLATELET # BLD AUTO: 238 K/UL (ref 150–350)
PMV BLD AUTO: 10.9 FL (ref 9.2–12.9)
POTASSIUM SERPL-SCNC: 4.5 MMOL/L (ref 3.5–5.1)
PROT SERPL-MCNC: 7 G/DL (ref 6–8.4)
RBC # BLD AUTO: 4.49 M/UL (ref 4–5.4)
SODIUM SERPL-SCNC: 138 MMOL/L (ref 136–145)
TRIGL SERPL-MCNC: 58 MG/DL (ref 30–150)
TSH SERPL DL<=0.005 MIU/L-ACNC: 1.1 UIU/ML (ref 0.4–4)
WBC # BLD AUTO: 5.14 K/UL (ref 3.9–12.7)

## 2020-10-22 PROCEDURE — 80053 COMPREHEN METABOLIC PANEL: CPT

## 2020-10-22 PROCEDURE — 80061 LIPID PANEL: CPT

## 2020-10-22 PROCEDURE — 85025 COMPLETE CBC W/AUTO DIFF WBC: CPT

## 2020-10-22 PROCEDURE — 36415 COLL VENOUS BLD VENIPUNCTURE: CPT | Mod: PO

## 2020-10-22 PROCEDURE — 84443 ASSAY THYROID STIM HORMONE: CPT

## 2020-10-22 PROCEDURE — 86803 HEPATITIS C AB TEST: CPT

## 2020-10-23 LAB — HCV AB SERPL QL IA: NEGATIVE

## 2021-01-13 ENCOUNTER — CLINICAL SUPPORT (OUTPATIENT)
Dept: URGENT CARE | Facility: CLINIC | Age: 41
End: 2021-01-13
Payer: COMMERCIAL

## 2021-01-13 DIAGNOSIS — Z11.52 ENCOUNTER FOR SCREENING LABORATORY TESTING FOR COVID-19 VIRUS: Primary | ICD-10-CM

## 2021-01-13 DIAGNOSIS — U07.1 COVID-19 VIRUS DETECTED: ICD-10-CM

## 2021-01-13 LAB
CTP QC/QA: YES
SARS-COV-2 RDRP RESP QL NAA+PROBE: POSITIVE

## 2021-01-13 PROCEDURE — U0002 COVID-19 LAB TEST NON-CDC: HCPCS | Mod: QW,S$GLB,, | Performed by: FAMILY MEDICINE

## 2021-01-13 PROCEDURE — U0002: ICD-10-PCS | Mod: QW,S$GLB,, | Performed by: FAMILY MEDICINE

## 2021-05-10 ENCOUNTER — PATIENT MESSAGE (OUTPATIENT)
Dept: RESEARCH | Facility: HOSPITAL | Age: 41
End: 2021-05-10

## 2021-08-09 ENCOUNTER — PATIENT MESSAGE (OUTPATIENT)
Dept: FAMILY MEDICINE | Facility: CLINIC | Age: 41
End: 2021-08-09

## 2021-08-09 DIAGNOSIS — Z01.84 ANTIBODY RESPONSE EXAMINATION: Primary | ICD-10-CM

## 2021-08-10 ENCOUNTER — LAB VISIT (OUTPATIENT)
Dept: LAB | Facility: HOSPITAL | Age: 41
End: 2021-08-10
Attending: INTERNAL MEDICINE
Payer: COMMERCIAL

## 2021-08-10 DIAGNOSIS — Z01.84 ANTIBODY RESPONSE EXAMINATION: ICD-10-CM

## 2021-08-10 PROCEDURE — 86769 SARS-COV-2 COVID-19 ANTIBODY: CPT | Performed by: INTERNAL MEDICINE

## 2021-08-10 PROCEDURE — 36415 COLL VENOUS BLD VENIPUNCTURE: CPT | Mod: PO | Performed by: INTERNAL MEDICINE

## 2021-08-11 LAB
SARS-COV-2 IGG SERPL IA-ACNC: 63.3 AU/ML
SARS-COV-2 IGG SERPL QL IA: POSITIVE

## 2021-09-15 DIAGNOSIS — Z12.31 OTHER SCREENING MAMMOGRAM: ICD-10-CM

## 2021-10-15 ENCOUNTER — HOSPITAL ENCOUNTER (OUTPATIENT)
Dept: RADIOLOGY | Facility: HOSPITAL | Age: 41
Discharge: HOME | End: 2021-10-15
Attending: INTERNAL MEDICINE
Payer: COMMERCIAL

## 2021-10-15 DIAGNOSIS — Z12.31 OTHER SCREENING MAMMOGRAM: ICD-10-CM

## 2021-10-15 PROCEDURE — 77067 SCR MAMMO BI INCL CAD: CPT | Mod: 26,,, | Performed by: RADIOLOGY

## 2021-10-15 PROCEDURE — 77067 MAMMO DIGITAL SCREENING BILAT WITH TOMO: ICD-10-PCS | Mod: 26,,, | Performed by: RADIOLOGY

## 2021-10-15 PROCEDURE — 77067 SCR MAMMO BI INCL CAD: CPT | Mod: TC,PO

## 2021-10-15 PROCEDURE — 77063 MAMMO DIGITAL SCREENING BILAT WITH TOMO: ICD-10-PCS | Mod: 26,,, | Performed by: RADIOLOGY

## 2021-10-15 PROCEDURE — 77063 BREAST TOMOSYNTHESIS BI: CPT | Mod: 26,,, | Performed by: RADIOLOGY

## 2021-10-16 ENCOUNTER — PATIENT MESSAGE (OUTPATIENT)
Dept: OBSTETRICS AND GYNECOLOGY | Facility: CLINIC | Age: 41
End: 2021-10-16
Payer: COMMERCIAL

## 2021-10-18 ENCOUNTER — TELEPHONE (OUTPATIENT)
Dept: RADIOLOGY | Facility: HOSPITAL | Age: 41
End: 2021-10-18

## 2021-10-18 DIAGNOSIS — R92.8 ABNORMAL MAMMOGRAM: Primary | ICD-10-CM

## 2021-10-26 ENCOUNTER — HOSPITAL ENCOUNTER (OUTPATIENT)
Dept: RADIOLOGY | Facility: HOSPITAL | Age: 41
Discharge: HOME | End: 2021-10-26
Attending: FAMILY MEDICINE
Payer: COMMERCIAL

## 2021-10-26 DIAGNOSIS — R92.8 ABNORMAL MAMMOGRAM: ICD-10-CM

## 2021-10-26 PROCEDURE — 77065 MAMMO DIGITAL DIAGNOSTIC RIGHT WITH TOMO: ICD-10-PCS | Mod: 26,RT,, | Performed by: RADIOLOGY

## 2021-10-26 PROCEDURE — 77061 MAMMO DIGITAL DIAGNOSTIC RIGHT WITH TOMO: ICD-10-PCS | Mod: 26,RT,, | Performed by: RADIOLOGY

## 2021-10-26 PROCEDURE — 77065 DX MAMMO INCL CAD UNI: CPT | Mod: 26,RT,, | Performed by: RADIOLOGY

## 2021-10-26 PROCEDURE — 77061 BREAST TOMOSYNTHESIS UNI: CPT | Mod: TC,PO,RT

## 2021-10-26 PROCEDURE — 77061 BREAST TOMOSYNTHESIS UNI: CPT | Mod: 26,RT,, | Performed by: RADIOLOGY

## 2022-04-05 ENCOUNTER — OFFICE VISIT (OUTPATIENT)
Dept: FAMILY MEDICINE | Facility: CLINIC | Age: 42
End: 2022-04-05
Payer: COMMERCIAL

## 2022-04-05 VITALS
WEIGHT: 156.94 LBS | DIASTOLIC BLOOD PRESSURE: 64 MMHG | SYSTOLIC BLOOD PRESSURE: 112 MMHG | RESPIRATION RATE: 18 BRPM | BODY MASS INDEX: 24.63 KG/M2 | HEIGHT: 67 IN | OXYGEN SATURATION: 99 % | HEART RATE: 97 BPM

## 2022-04-05 DIAGNOSIS — Z00.00 PREVENTATIVE HEALTH CARE: Primary | ICD-10-CM

## 2022-04-05 DIAGNOSIS — L70.0 ACNE VULGARIS: ICD-10-CM

## 2022-04-05 PROCEDURE — 3074F SYST BP LT 130 MM HG: CPT | Mod: CPTII,S$GLB,, | Performed by: INTERNAL MEDICINE

## 2022-04-05 PROCEDURE — 3078F DIAST BP <80 MM HG: CPT | Mod: CPTII,S$GLB,, | Performed by: INTERNAL MEDICINE

## 2022-04-05 PROCEDURE — 1160F PR REVIEW ALL MEDS BY PRESCRIBER/CLIN PHARMACIST DOCUMENTED: ICD-10-PCS | Mod: CPTII,S$GLB,, | Performed by: INTERNAL MEDICINE

## 2022-04-05 PROCEDURE — 99396 PR PREVENTIVE VISIT,EST,40-64: ICD-10-PCS | Mod: S$GLB,,, | Performed by: INTERNAL MEDICINE

## 2022-04-05 PROCEDURE — 3008F PR BODY MASS INDEX (BMI) DOCUMENTED: ICD-10-PCS | Mod: CPTII,S$GLB,, | Performed by: INTERNAL MEDICINE

## 2022-04-05 PROCEDURE — 1159F MED LIST DOCD IN RCRD: CPT | Mod: CPTII,S$GLB,, | Performed by: INTERNAL MEDICINE

## 2022-04-05 PROCEDURE — 1160F RVW MEDS BY RX/DR IN RCRD: CPT | Mod: CPTII,S$GLB,, | Performed by: INTERNAL MEDICINE

## 2022-04-05 PROCEDURE — 99396 PREV VISIT EST AGE 40-64: CPT | Mod: S$GLB,,, | Performed by: INTERNAL MEDICINE

## 2022-04-05 PROCEDURE — 99999 PR PBB SHADOW E&M-EST. PATIENT-LVL III: CPT | Mod: PBBFAC,,, | Performed by: INTERNAL MEDICINE

## 2022-04-05 PROCEDURE — 3074F PR MOST RECENT SYSTOLIC BLOOD PRESSURE < 130 MM HG: ICD-10-PCS | Mod: CPTII,S$GLB,, | Performed by: INTERNAL MEDICINE

## 2022-04-05 PROCEDURE — 99999 PR PBB SHADOW E&M-EST. PATIENT-LVL III: ICD-10-PCS | Mod: PBBFAC,,, | Performed by: INTERNAL MEDICINE

## 2022-04-05 PROCEDURE — 3078F PR MOST RECENT DIASTOLIC BLOOD PRESSURE < 80 MM HG: ICD-10-PCS | Mod: CPTII,S$GLB,, | Performed by: INTERNAL MEDICINE

## 2022-04-05 PROCEDURE — 3008F BODY MASS INDEX DOCD: CPT | Mod: CPTII,S$GLB,, | Performed by: INTERNAL MEDICINE

## 2022-04-05 PROCEDURE — 1159F PR MEDICATION LIST DOCUMENTED IN MEDICAL RECORD: ICD-10-PCS | Mod: CPTII,S$GLB,, | Performed by: INTERNAL MEDICINE

## 2022-04-05 RX ORDER — TRETINOIN 0.5 MG/G
CREAM TOPICAL NIGHTLY
Qty: 45 G | Refills: 1 | Status: SHIPPED | OUTPATIENT
Start: 2022-04-05

## 2022-04-05 RX ORDER — ASCORBIC ACID 500 MG
500 TABLET ORAL DAILY
COMMUNITY
End: 2023-12-01

## 2022-04-05 NOTE — PROGRESS NOTES
Assessment and Plan:    1. Preventative health care  Discussed age appropriate preventative healthcare items such as cancer screenings and recommended immunizations. Discussed whether patient is using tobacco, alcohol, or illicit drugs and any concerns were discussed. Discussed maintenance of a healthy weight. Patient queried if she has any additional questions about preventative healthcare and all questions were answered.  Due for routine screening mammogram in Oct.  - Comprehensive Metabolic Panel; Future  - CBC Auto Differential; Future  - Lipid Panel; Future  - TSH; Future    2. Acne vulgaris  - tretinoin (RETIN-A) 0.05 % cream; Apply topically every evening.  Dispense: 45 g; Refill: 1    ______________________________________________________________________    Subjective:    Chief Complaint:  Annual exam    HPI:  Bharti is a 41 y.o. year old patient here for annual exam.     She had an abnormal mammogram last year, diagnostic showed no abnormalities.     She had been having isolated episodes of palpitations when she had first booked this apt, but they have since gone away. Had noticed an association with caffeine.     She had a hysterectomy and anemia had improved after this.     Still using tretinoin for acne and this has been helpful.    Past Medical History:  Past Medical History:   Diagnosis Date    GERD (gastroesophageal reflux disease)     History of anemia        Past Surgical History:  Past Surgical History:   Procedure Laterality Date    ADENOIDECTOMY  1987     SECTION      X4    CYSTOSCOPY N/A 2020    Procedure: CYSTOSCOPY;  Surgeon: Radha Faye MD;  Location: [facility];  Service: OB/GYN;  Laterality: N/A;    HYSTERECTOMY      HYSTEROSCOPY N/A 6/15/2018    Procedure: HYSTEROSCOPY;  Surgeon: Radha Faye MD;  [facility];  Service: OB/GYN;  Laterality: N/A;    LAPAROSCOPIC LYSIS OF ADHESIONS N/A 2020    Procedure: LYSIS, ADHESIONS, LAPAROSCOPIC;   Surgeon: Radha Faye MD;  Location: [facility] OR;  Service: OB/GYN;  Laterality: N/A;    LAPAROSCOPIC SALPINGECTOMY Bilateral 2020    Procedure: SALPINGECTOMY, LAPAROSCOPIC;  Surgeon: Radha Faye MD;  Location: [facility] OR;  Service: OB/GYN;  Laterality: Bilateral;    LAPAROSCOPIC TOTAL HYSTERECTOMY N/A 2020    Procedure: HYSTERECTOMY, TOTAL, LAPAROSCOPIC;  Surgeon: Radha Faye MD;  Location: [facility] OR;  Service: OB/GYN;  Laterality: N/A;    THERMAL ABLATION OF ENDOMETRIUM USING HYSTEROSCOPY N/A 6/15/2018    Procedure: ABLATION ENDOMETRIAL THERMAL - NOVASURE;  Surgeon: Radha Faye MD;  Location: [facility];  Service: OB/GYN;  Laterality: N/A;    TONSILLECTOMY, ADENOIDECTOMY      TUBAL LIGATION      with 4th     WISDOM TOOTH EXTRACTION         Family History:  Family History   Problem Relation Age of Onset    Hypertension Paternal Grandfather     Heart disease Maternal Grandmother     Diabetes type II Maternal Grandmother         Borderline    Heart disease Maternal Grandfather     Hypertension Maternal Grandfather     Emphysema Maternal Grandfather         Smoker    Hypertension Father     Heart disease Father     No Known Problems Mother     Breast cancer Neg Hx     Ovarian cancer Neg Hx        Social History:  Social History     Socioeconomic History    Marital status:    Tobacco Use    Smoking status: Never Smoker    Smokeless tobacco: Never Used   Substance and Sexual Activity    Alcohol use: Yes     Alcohol/week: 0.0 - 1.0 standard drinks     Comment: Occasional glass of wine    Drug use: No    Sexual activity: Yes     Partners: Male     Birth control/protection: See Surgical Hx   Social History Narrative    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      ibuprofen (ADVIL,MOTRIN) 600 MG tablet Take 1 tablet (600 mg total) by mouth every 6 (six) hours. 30 tablet 1    tretinoin (RETIN-A) 0.05 % cream Apply topically every evening. 45 g 1    UNABLE TO FIND Take 1 tablet by mouth once daily. Vitamin A&D      [DISCONTINUED] nitrofurantoin (MACRODANTIN) 100 MG capsule Take 1 capsule (100 mg total) by mouth daily as needed (post-coital UTI prophylaxis). 10 capsule 2    [DISCONTINUED] spironolactone (ALDACTONE) 25 MG tablet Take 2 tablets (50 mg total) by mouth once daily. 60 tablet 2     No current facility-administered medications on file prior to visit.       Allergies:  Latex, natural rubber    Immunizations:  Immunization History   Administered Date(s) Administered    Hepatitis B, Pediatric/Adolescent 04/14/1993, 07/08/1993, 01/26/1994    Influenza - Quadrivalent 09/29/2014    Influenza - Quadrivalent - PF *Preferred* (6 months and older) 10/10/2016    Influenza - Trivalent (ADULT) 10/15/2012, 10/01/2015    MMR 10/15/1980, 06/27/1990    Tdap 11/15/2018       Review of Systems:  Review of Systems   Constitutional: Negative for activity change and unexpected weight change.   HENT: Negative for hearing loss, rhinorrhea and trouble swallowing.    Eyes: Negative for discharge.   Respiratory: Negative for chest tightness and wheezing.    Cardiovascular: Negative for chest pain.   Gastrointestinal: Negative for blood in stool, constipation, diarrhea and vomiting.   Endocrine: Negative for polydipsia and polyuria.   Genitourinary: Negative for difficulty urinating, dysuria, hematuria and menstrual problem.   Musculoskeletal: Negative for arthralgias, joint swelling and neck pain.   Neurological: Negative for weakness and headaches.   Psychiatric/Behavioral: Negative for confusion and dysphoric mood.     Entered by patient and reviewed and updated during visit      Objective:    Vitals:  Vitals:    04/05/22 1357   BP: 112/64   Pulse: 97  "  Resp: 18   SpO2: 99%   Weight: 71.2 kg (156 lb 15.5 oz)   Height: 5' 7" (1.702 m)   PainSc: 0-No pain       Physical Exam  Vitals reviewed.   Constitutional:       General: She is not in acute distress.     Appearance: She is well-developed. She is not diaphoretic.   HENT:      Mouth/Throat:      Pharynx: No oropharyngeal exudate.   Eyes:      General: No scleral icterus.        Right eye: No discharge.         Left eye: No discharge.      Conjunctiva/sclera: Conjunctivae normal.   Neck:      Thyroid: No thyromegaly.      Trachea: No tracheal deviation.   Cardiovascular:      Rate and Rhythm: Normal rate and regular rhythm.      Heart sounds: Normal heart sounds. No murmur heard.  Pulmonary:      Effort: Pulmonary effort is normal. No respiratory distress.      Breath sounds: Normal breath sounds. No wheezing, rhonchi or rales.   Abdominal:      General: Bowel sounds are normal. There is no distension.      Palpations: Abdomen is soft.      Tenderness: There is no abdominal tenderness. There is no guarding or rebound.   Musculoskeletal:      Cervical back: Neck supple.      Right lower leg: No edema.      Left lower leg: No edema.   Lymphadenopathy:      Cervical: No cervical adenopathy.   Skin:     General: Skin is warm and dry.   Neurological:      Mental Status: She is alert and oriented to person, place, and time.   Psychiatric:         Behavior: Behavior normal.         Judgment: Judgment normal.         Data:  Bilirubin usually slightly elevated  Anemia resolved on last labs    Barbara Hall MD  Internal Medicine        "

## 2022-04-06 ENCOUNTER — LAB VISIT (OUTPATIENT)
Dept: LAB | Facility: HOSPITAL | Age: 42
End: 2022-04-06
Attending: INTERNAL MEDICINE
Payer: COMMERCIAL

## 2022-04-06 DIAGNOSIS — Z00.00 PREVENTATIVE HEALTH CARE: ICD-10-CM

## 2022-04-06 LAB
ALBUMIN SERPL BCP-MCNC: 4 G/DL (ref 3.5–5.2)
ALP SERPL-CCNC: 64 U/L (ref 55–135)
ALT SERPL W/O P-5'-P-CCNC: 13 U/L (ref 10–44)
ANION GAP SERPL CALC-SCNC: 9 MMOL/L (ref 8–16)
AST SERPL-CCNC: 14 U/L (ref 10–40)
BASOPHILS # BLD AUTO: 0.06 K/UL (ref 0–0.2)
BASOPHILS NFR BLD: 0.8 % (ref 0–1.9)
BILIRUB SERPL-MCNC: 0.5 MG/DL (ref 0.1–1)
BUN SERPL-MCNC: 10 MG/DL (ref 6–20)
CALCIUM SERPL-MCNC: 9.9 MG/DL (ref 8.7–10.5)
CHLORIDE SERPL-SCNC: 104 MMOL/L (ref 95–110)
CHOLEST SERPL-MCNC: 178 MG/DL (ref 120–199)
CHOLEST/HDLC SERPL: 3.6 {RATIO} (ref 2–5)
CO2 SERPL-SCNC: 29 MMOL/L (ref 23–29)
CREAT SERPL-MCNC: 0.7 MG/DL (ref 0.5–1.4)
DIFFERENTIAL METHOD: ABNORMAL
EOSINOPHIL # BLD AUTO: 0.3 K/UL (ref 0–0.5)
EOSINOPHIL NFR BLD: 4.4 % (ref 0–8)
ERYTHROCYTE [DISTWIDTH] IN BLOOD BY AUTOMATED COUNT: 12.4 % (ref 11.5–14.5)
EST. GFR  (AFRICAN AMERICAN): >60 ML/MIN/1.73 M^2
EST. GFR  (NON AFRICAN AMERICAN): >60 ML/MIN/1.73 M^2
GLUCOSE SERPL-MCNC: 90 MG/DL (ref 70–110)
HCT VFR BLD AUTO: 41.7 % (ref 37–48.5)
HDLC SERPL-MCNC: 49 MG/DL (ref 40–75)
HDLC SERPL: 27.5 % (ref 20–50)
HGB BLD-MCNC: 13.2 G/DL (ref 12–16)
IMM GRANULOCYTES # BLD AUTO: 0.03 K/UL (ref 0–0.04)
IMM GRANULOCYTES NFR BLD AUTO: 0.4 % (ref 0–0.5)
LDLC SERPL CALC-MCNC: 113.6 MG/DL (ref 63–159)
LYMPHOCYTES # BLD AUTO: 1.9 K/UL (ref 1–4.8)
LYMPHOCYTES NFR BLD: 26.1 % (ref 18–48)
MCH RBC QN AUTO: 29.3 PG (ref 27–31)
MCHC RBC AUTO-ENTMCNC: 31.7 G/DL (ref 32–36)
MCV RBC AUTO: 93 FL (ref 82–98)
MONOCYTES # BLD AUTO: 0.6 K/UL (ref 0.3–1)
MONOCYTES NFR BLD: 7.6 % (ref 4–15)
NEUTROPHILS # BLD AUTO: 4.4 K/UL (ref 1.8–7.7)
NEUTROPHILS NFR BLD: 60.7 % (ref 38–73)
NONHDLC SERPL-MCNC: 129 MG/DL
NRBC BLD-RTO: 0 /100 WBC
PLATELET # BLD AUTO: 258 K/UL (ref 150–450)
PMV BLD AUTO: 11.1 FL (ref 9.2–12.9)
POTASSIUM SERPL-SCNC: 4.7 MMOL/L (ref 3.5–5.1)
PROT SERPL-MCNC: 7 G/DL (ref 6–8.4)
RBC # BLD AUTO: 4.51 M/UL (ref 4–5.4)
SODIUM SERPL-SCNC: 142 MMOL/L (ref 136–145)
TRIGL SERPL-MCNC: 77 MG/DL (ref 30–150)
TSH SERPL DL<=0.005 MIU/L-ACNC: 0.84 UIU/ML (ref 0.4–4)
WBC # BLD AUTO: 7.28 K/UL (ref 3.9–12.7)

## 2022-04-06 PROCEDURE — 80053 COMPREHEN METABOLIC PANEL: CPT | Performed by: INTERNAL MEDICINE

## 2022-04-06 PROCEDURE — 84443 ASSAY THYROID STIM HORMONE: CPT | Performed by: INTERNAL MEDICINE

## 2022-04-06 PROCEDURE — 80061 LIPID PANEL: CPT | Performed by: INTERNAL MEDICINE

## 2022-04-06 PROCEDURE — 85025 COMPLETE CBC W/AUTO DIFF WBC: CPT | Performed by: INTERNAL MEDICINE

## 2022-04-06 PROCEDURE — 36415 COLL VENOUS BLD VENIPUNCTURE: CPT | Mod: PO | Performed by: INTERNAL MEDICINE

## 2022-04-13 ENCOUNTER — TELEPHONE (OUTPATIENT)
Dept: FAMILY MEDICINE | Facility: CLINIC | Age: 42
End: 2022-04-13
Payer: COMMERCIAL

## 2022-04-13 NOTE — TELEPHONE ENCOUNTER
PA was approved on 4/13/22 thru covermymeds    Called [facility] to notify of PA approval   pharm stated they will notify pt of the cost of the medication

## 2022-04-13 NOTE — TELEPHONE ENCOUNTER
Spoke to pharmacy about the PA approval for Tretinoin. Pharm stated will call pt to le them know the copay amount

## 2022-10-25 ENCOUNTER — PATIENT MESSAGE (OUTPATIENT)
Dept: FAMILY MEDICINE | Facility: CLINIC | Age: 42
End: 2022-10-25
Payer: COMMERCIAL

## 2022-10-25 DIAGNOSIS — Z12.31 ENCOUNTER FOR SCREENING MAMMOGRAM FOR BREAST CANCER: Primary | ICD-10-CM

## 2022-10-26 ENCOUNTER — PATIENT MESSAGE (OUTPATIENT)
Dept: FAMILY MEDICINE | Facility: CLINIC | Age: 42
End: 2022-10-26
Payer: COMMERCIAL

## 2022-10-26 DIAGNOSIS — Z00.00 PREVENTATIVE HEALTH CARE: Primary | ICD-10-CM

## 2022-10-26 DIAGNOSIS — R92.8 ABNORMAL MAMMOGRAM: ICD-10-CM

## 2023-12-01 ENCOUNTER — OFFICE VISIT (OUTPATIENT)
Dept: FAMILY MEDICINE | Facility: CLINIC | Age: 43
End: 2023-12-01
Payer: COMMERCIAL

## 2023-12-01 VITALS
SYSTOLIC BLOOD PRESSURE: 120 MMHG | DIASTOLIC BLOOD PRESSURE: 70 MMHG | HEIGHT: 67 IN | HEART RATE: 91 BPM | WEIGHT: 164.69 LBS | BODY MASS INDEX: 25.85 KG/M2 | OXYGEN SATURATION: 98 %

## 2023-12-01 DIAGNOSIS — Z00.00 PREVENTATIVE HEALTH CARE: Primary | ICD-10-CM

## 2023-12-01 PROCEDURE — 1160F RVW MEDS BY RX/DR IN RCRD: CPT | Mod: CPTII,S$GLB,, | Performed by: INTERNAL MEDICINE

## 2023-12-01 PROCEDURE — 3074F SYST BP LT 130 MM HG: CPT | Mod: CPTII,S$GLB,, | Performed by: INTERNAL MEDICINE

## 2023-12-01 PROCEDURE — 99396 PREV VISIT EST AGE 40-64: CPT | Mod: S$GLB,,, | Performed by: INTERNAL MEDICINE

## 2023-12-01 PROCEDURE — 99396 PR PREVENTIVE VISIT,EST,40-64: ICD-10-PCS | Mod: S$GLB,,, | Performed by: INTERNAL MEDICINE

## 2023-12-01 PROCEDURE — 3078F DIAST BP <80 MM HG: CPT | Mod: CPTII,S$GLB,, | Performed by: INTERNAL MEDICINE

## 2023-12-01 PROCEDURE — 99999 PR PBB SHADOW E&M-EST. PATIENT-LVL IV: ICD-10-PCS | Mod: PBBFAC,,, | Performed by: INTERNAL MEDICINE

## 2023-12-01 PROCEDURE — 3008F PR BODY MASS INDEX (BMI) DOCUMENTED: ICD-10-PCS | Mod: CPTII,S$GLB,, | Performed by: INTERNAL MEDICINE

## 2023-12-01 PROCEDURE — 3078F PR MOST RECENT DIASTOLIC BLOOD PRESSURE < 80 MM HG: ICD-10-PCS | Mod: CPTII,S$GLB,, | Performed by: INTERNAL MEDICINE

## 2023-12-01 PROCEDURE — 1159F MED LIST DOCD IN RCRD: CPT | Mod: CPTII,S$GLB,, | Performed by: INTERNAL MEDICINE

## 2023-12-01 PROCEDURE — 1160F PR REVIEW ALL MEDS BY PRESCRIBER/CLIN PHARMACIST DOCUMENTED: ICD-10-PCS | Mod: CPTII,S$GLB,, | Performed by: INTERNAL MEDICINE

## 2023-12-01 PROCEDURE — 3074F PR MOST RECENT SYSTOLIC BLOOD PRESSURE < 130 MM HG: ICD-10-PCS | Mod: CPTII,S$GLB,, | Performed by: INTERNAL MEDICINE

## 2023-12-01 PROCEDURE — 3008F BODY MASS INDEX DOCD: CPT | Mod: CPTII,S$GLB,, | Performed by: INTERNAL MEDICINE

## 2023-12-01 PROCEDURE — 1159F PR MEDICATION LIST DOCUMENTED IN MEDICAL RECORD: ICD-10-PCS | Mod: CPTII,S$GLB,, | Performed by: INTERNAL MEDICINE

## 2023-12-01 PROCEDURE — 99999 PR PBB SHADOW E&M-EST. PATIENT-LVL IV: CPT | Mod: PBBFAC,,, | Performed by: INTERNAL MEDICINE

## 2023-12-01 RX ORDER — MAGNESIUM GLYCINATE 100 MG
CAPSULE ORAL
COMMUNITY
Start: 2023-10-01

## 2023-12-01 RX ORDER — PHENYLPROPANOLAMINE/CLEMASTINE 75-1.34MG
TABLET, EXTENDED RELEASE ORAL
COMMUNITY
Start: 2023-10-01

## 2023-12-01 RX ORDER — CALCIUM CARB/VITAMIN D3/VIT K1 500-500-40
TABLET,CHEWABLE ORAL
COMMUNITY
Start: 2023-10-01

## 2023-12-01 NOTE — PROGRESS NOTES
Assessment and Plan:    1. Preventative health care  Discussed age appropriate preventative healthcare items such as cancer screenings and recommended immunizations. Discussed whether patient is using tobacco, alcohol, or illicit drugs and any concerns were discussed. Discussed maintenance of a healthy weight. Patient queried if she has any additional questions about preventative healthcare and all questions were answered.  - Comprehensive Metabolic Panel; Future  - CBC Auto Differential; Future  - Lipid Panel; Future  - TSH; Future  - Hemoglobin A1C; Future  - FOLLICLE STIMULATING HORMONE; Future      ______________________________________________________________________    Subjective:    Chief Complaint:  Annual exam    HPI:  Bharti is a 43 y.o. year old patient here for annual exam.     She gets occasional pain in the area of her thyroid gland and is concerned about possible nodule. She does have a history of GERD and has been having occasional heartburn.     She had a hysterectomy several years ago and is wondering if she needs to have any hormonal testing. Has had some irritability, but not having hot flashes or any other significant symptoms.    Past Medical History:  Past Medical History:   Diagnosis Date    GERD (gastroesophageal reflux disease)     History of anemia        Past Surgical History:  Past Surgical History:   Procedure Laterality Date    ADENOIDECTOMY  1987     SECTION      X4    CYSTOSCOPY N/A 2020    Procedure: CYSTOSCOPY;  Surgeon: Radha Faye MD;  Location: [facility];  Service: OB/GYN;  Laterality: N/A;    HYSTERECTOMY      HYSTEROSCOPY N/A 6/15/2018    Procedure: HYSTEROSCOPY;  Surgeon: Radha Faye MD;  Location: [facility];  Service: OB/GYN;  Laterality: N/A;    LAPAROSCOPIC LYSIS OF ADHESIONS N/A 2020    Procedure: LYSIS, ADHESIONS, LAPAROSCOPIC;  Surgeon: Radha Faye MD;  Location: [facility] OR;  Service: OB/GYN;  Laterality: N/A;    LAPAROSCOPIC  SALPINGECTOMY Bilateral 2020    Procedure: SALPINGECTOMY, LAPAROSCOPIC;  Surgeon: Radha Faye MD;  Location: [facility] OR;  Service: OB/GYN;  Laterality: Bilateral;    LAPAROSCOPIC TOTAL HYSTERECTOMY N/A 2020    Procedure: HYSTERECTOMY, TOTAL, LAPAROSCOPIC;  Surgeon: Radha Faye MD;  Location: [facility] OR;  Service: OB/GYN;  Laterality: N/A;    THERMAL ABLATION OF ENDOMETRIUM USING HYSTEROSCOPY N/A 6/15/2018    Procedure: ABLATION ENDOMETRIAL THERMAL - NOVASURE;  Surgeon: Radha Faye MD;  Location: [facility];  Service: OB/GYN;  Laterality: N/A;    TONSILLECTOMY, ADENOIDECTOMY      TUBAL LIGATION      with 4th     WISDOM TOOTH EXTRACTION         Family History:  Family History   Problem Relation Age of Onset    Hypertension Paternal Grandfather     Heart disease Maternal Grandmother     Diabetes type II Maternal Grandmother         Borderline    Heart disease Maternal Grandfather     Hypertension Maternal Grandfather     Emphysema Maternal Grandfather         Smoker    Hypertension Father     Heart disease Father     No Known Problems Mother     Breast cancer Neg Hx     Ovarian cancer Neg Hx        Social History:  Social History     Socioeconomic History    Marital status:    Tobacco Use    Smoking status: Never    Smokeless tobacco: Never   Substance and Sexual Activity    Alcohol use: Yes     Alcohol/week: 0.0 - 1.0 standard drinks of alcohol     Comment: Occasional glass of wine    Drug use: No    Sexual activity: Yes     Partners: Male     Birth control/protection: See Surgical Hx   Social History Narrative    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 45 g 1    ascorbic acid, vitamin C, (VITAMIN C) 500 MG tablet Take 500 mg by mouth once daily.      ibuprofen (ADVIL,MOTRIN) 600 MG tablet Take 1 tablet (600 mg total) by mouth every 6 (six) hours. (Patient not taking: Reported on 12/1/2023) 30 tablet 1    UNABLE TO FIND Take 1 tablet by mouth once daily. Vitamin A&D       No current facility-administered medications on file prior to visit.       Allergies:  Latex, natural rubber    Immunizations:  Immunization History   Administered Date(s) Administered    Hepatitis B, Pediatric/Adolescent 04/14/1993, 07/08/1993, 01/26/1994    Influenza - Quadrivalent 09/29/2014    Influenza - Quadrivalent - PF *Preferred* (6 months and older) 09/29/2014, 10/10/2016    Influenza - Trivalent (ADULT) 10/15/2012, 10/01/2015    MMR 10/15/1980, 06/27/1990    Tdap 11/15/2018       Review of Systems:  Review of Systems   Constitutional:  Positive for activity change. Negative for unexpected weight change.   HENT:  Negative for hearing loss, rhinorrhea and trouble swallowing.    Eyes:  Positive for visual disturbance. Negative for discharge.   Respiratory:  Negative for chest tightness and wheezing.    Cardiovascular:  Negative for chest pain and palpitations.   Gastrointestinal:  Negative for blood in stool, constipation, diarrhea and vomiting.   Endocrine: Negative for polydipsia and polyuria.   Genitourinary:  Negative for difficulty urinating, dysuria, hematuria and menstrual problem.   Musculoskeletal:  Negative for arthralgias, joint swelling and neck pain.   Neurological:  Negative for weakness and headaches.   Psychiatric/Behavioral:  Negative for confusion and dysphoric mood.      Entered by patient and reviewed and updated during visit      Objective:    Vitals:  Vitals:    12/01/23 1305   BP:  "120/70   Pulse: 91   SpO2: 98%   Weight: 74.7 kg (164 lb 10.9 oz)   Height: 5' 7" (1.702 m)   PainSc: 0-No pain       Physical Exam  Vitals reviewed.   Constitutional:       General: She is not in acute distress.     Appearance: She is well-developed. She is not diaphoretic.   HENT:      Mouth/Throat:      Pharynx: No oropharyngeal exudate.   Eyes:      General: No scleral icterus.        Right eye: No discharge.         Left eye: No discharge.      Conjunctiva/sclera: Conjunctivae normal.   Neck:      Thyroid: No thyromegaly.      Trachea: No tracheal deviation.      Comments: no thyroid nodule  Cardiovascular:      Rate and Rhythm: Normal rate and regular rhythm.      Heart sounds: Normal heart sounds. No murmur heard.  Pulmonary:      Effort: Pulmonary effort is normal. No respiratory distress.      Breath sounds: Normal breath sounds. No wheezing, rhonchi or rales.   Abdominal:      General: Bowel sounds are normal. There is no distension.      Palpations: Abdomen is soft.      Tenderness: There is no abdominal tenderness. There is no guarding or rebound.   Musculoskeletal:      Cervical back: Neck supple.      Right lower leg: No edema.      Left lower leg: No edema.   Lymphadenopathy:      Cervical: No cervical adenopathy.   Skin:     General: Skin is warm and dry.   Neurological:      Mental Status: She is alert and oriented to person, place, and time.   Psychiatric:         Behavior: Behavior normal.         Judgment: Judgment normal.         Data:  Lipids OK last year    Barbara Hall MD  Internal Medicine        "

## 2023-12-13 ENCOUNTER — LAB VISIT (OUTPATIENT)
Dept: LAB | Facility: HOSPITAL | Age: 43
End: 2023-12-13
Attending: INTERNAL MEDICINE
Payer: COMMERCIAL

## 2023-12-13 DIAGNOSIS — Z00.00 PREVENTATIVE HEALTH CARE: ICD-10-CM

## 2023-12-13 LAB
ALBUMIN SERPL BCP-MCNC: 3.7 G/DL (ref 3.5–5.2)
ALP SERPL-CCNC: 66 U/L (ref 55–135)
ALT SERPL W/O P-5'-P-CCNC: 17 U/L (ref 10–44)
ANION GAP SERPL CALC-SCNC: 6 MMOL/L (ref 8–16)
AST SERPL-CCNC: 15 U/L (ref 10–40)
BASOPHILS # BLD AUTO: 0.05 K/UL (ref 0–0.2)
BASOPHILS NFR BLD: 0.7 % (ref 0–1.9)
BILIRUB SERPL-MCNC: 1 MG/DL (ref 0.1–1)
BUN SERPL-MCNC: 11 MG/DL (ref 6–20)
CALCIUM SERPL-MCNC: 9.1 MG/DL (ref 8.7–10.5)
CHLORIDE SERPL-SCNC: 105 MMOL/L (ref 95–110)
CHOLEST SERPL-MCNC: 183 MG/DL (ref 120–199)
CHOLEST/HDLC SERPL: 4 {RATIO} (ref 2–5)
CO2 SERPL-SCNC: 27 MMOL/L (ref 23–29)
CREAT SERPL-MCNC: 0.7 MG/DL (ref 0.5–1.4)
DIFFERENTIAL METHOD: NORMAL
EOSINOPHIL # BLD AUTO: 0.4 K/UL (ref 0–0.5)
EOSINOPHIL NFR BLD: 4.9 % (ref 0–8)
ERYTHROCYTE [DISTWIDTH] IN BLOOD BY AUTOMATED COUNT: 13.2 % (ref 11.5–14.5)
EST. GFR  (NO RACE VARIABLE): >60 ML/MIN/1.73 M^2
ESTIMATED AVG GLUCOSE: 105 MG/DL (ref 68–131)
FSH SERPL-ACNC: 9.34 MIU/ML
GLUCOSE SERPL-MCNC: 86 MG/DL (ref 70–110)
HBA1C MFR BLD: 5.3 % (ref 4–5.6)
HCT VFR BLD AUTO: 39 % (ref 37–48.5)
HDLC SERPL-MCNC: 46 MG/DL (ref 40–75)
HDLC SERPL: 25.1 % (ref 20–50)
HGB BLD-MCNC: 13 G/DL (ref 12–16)
IMM GRANULOCYTES # BLD AUTO: 0.01 K/UL (ref 0–0.04)
IMM GRANULOCYTES NFR BLD AUTO: 0.1 % (ref 0–0.5)
LDLC SERPL CALC-MCNC: 116.4 MG/DL (ref 63–159)
LYMPHOCYTES # BLD AUTO: 2.2 K/UL (ref 1–4.8)
LYMPHOCYTES NFR BLD: 28.5 % (ref 18–48)
MCH RBC QN AUTO: 29.5 PG (ref 27–31)
MCHC RBC AUTO-ENTMCNC: 33.3 G/DL (ref 32–36)
MCV RBC AUTO: 88 FL (ref 82–98)
MONOCYTES # BLD AUTO: 0.6 K/UL (ref 0.3–1)
MONOCYTES NFR BLD: 7.3 % (ref 4–15)
NEUTROPHILS # BLD AUTO: 4.4 K/UL (ref 1.8–7.7)
NEUTROPHILS NFR BLD: 58.5 % (ref 38–73)
NONHDLC SERPL-MCNC: 137 MG/DL
NRBC BLD-RTO: 0 /100 WBC
PLATELET # BLD AUTO: 239 K/UL (ref 150–450)
PMV BLD AUTO: 10.4 FL (ref 9.2–12.9)
POTASSIUM SERPL-SCNC: 4.1 MMOL/L (ref 3.5–5.1)
PROT SERPL-MCNC: 6.6 G/DL (ref 6–8.4)
RBC # BLD AUTO: 4.41 M/UL (ref 4–5.4)
SODIUM SERPL-SCNC: 138 MMOL/L (ref 136–145)
TRIGL SERPL-MCNC: 103 MG/DL (ref 30–150)
TSH SERPL DL<=0.005 MIU/L-ACNC: 1.02 UIU/ML (ref 0.4–4)
WBC # BLD AUTO: 7.57 K/UL (ref 3.9–12.7)

## 2023-12-13 PROCEDURE — 84443 ASSAY THYROID STIM HORMONE: CPT | Performed by: INTERNAL MEDICINE

## 2023-12-13 PROCEDURE — 85025 COMPLETE CBC W/AUTO DIFF WBC: CPT | Performed by: INTERNAL MEDICINE

## 2023-12-13 PROCEDURE — 80061 LIPID PANEL: CPT | Performed by: INTERNAL MEDICINE

## 2023-12-13 PROCEDURE — 80053 COMPREHEN METABOLIC PANEL: CPT | Performed by: INTERNAL MEDICINE

## 2023-12-13 PROCEDURE — 83036 HEMOGLOBIN GLYCOSYLATED A1C: CPT | Performed by: INTERNAL MEDICINE

## 2023-12-13 PROCEDURE — 83001 ASSAY OF GONADOTROPIN (FSH): CPT | Performed by: INTERNAL MEDICINE

## 2023-12-13 PROCEDURE — 36415 COLL VENOUS BLD VENIPUNCTURE: CPT | Mod: PO | Performed by: INTERNAL MEDICINE

## 2024-01-18 ENCOUNTER — PATIENT MESSAGE (OUTPATIENT)
Dept: FAMILY MEDICINE | Facility: CLINIC | Age: 44
End: 2024-01-18
Payer: COMMERCIAL

## 2024-08-19 ENCOUNTER — PATIENT MESSAGE (OUTPATIENT)
Dept: ADMINISTRATIVE | Facility: HOSPITAL | Age: 44
End: 2024-08-19
Payer: COMMERCIAL

## 2024-08-20 ENCOUNTER — PATIENT OUTREACH (OUTPATIENT)
Dept: ADMINISTRATIVE | Facility: HOSPITAL | Age: 44
End: 2024-08-20
Payer: COMMERCIAL

## 2024-08-20 DIAGNOSIS — Z12.31 ENCOUNTER FOR SCREENING MAMMOGRAM FOR MALIGNANT NEOPLASM OF BREAST: Primary | ICD-10-CM

## 2024-08-20 NOTE — PROGRESS NOTES
Population Health Chart Review & Patient Outreach Details      Additional [facility] Health Notes:               Updates Requested / Reviewed:      Updated Care Coordination Note and Immunizations Reconciliation Completed or Queried: [place]         Health Maintenance Topics Overdue:      [facility] Score: 1     Colon Cancer Screening                       Health Maintenance Topic(s) Outreach Outcomes & Actions Taken:    Breast Cancer Screening - Outreach Outcomes & Actions Taken  : Mammogram Order Placed    Colorectal Cancer Screening - Outreach Outcomes & Actions Taken  : Portal message sent

## 2024-09-16 ENCOUNTER — PATIENT OUTREACH (OUTPATIENT)
Dept: ADMINISTRATIVE | Facility: HOSPITAL | Age: 44
End: 2024-09-16
Payer: COMMERCIAL

## 2024-09-16 DIAGNOSIS — Z12.11 SCREEN FOR COLON CANCER: Primary | ICD-10-CM

## 2024-09-16 NOTE — PROGRESS NOTES
Population Health Chart Review & Patient Outreach Details      Additional [facility] Health Notes:               Updates Requested / Reviewed:      Updated Care Coordination Note and Immunizations Reconciliation Completed or Queried: [place]         Health Maintenance Topics Overdue:      [facility] Score: 1     Colon Cancer Screening                       Health Maintenance Topic(s) Outreach Outcomes & Actions Taken:    Colorectal Cancer Screening - Outreach Outcomes & Actions Taken  : Colonoscopy Case Request / Referral / Home Test Order Placed

## 2024-09-23 ENCOUNTER — LAB VISIT (OUTPATIENT)
Dept: LAB | Facility: HOSPITAL | Age: 44
End: 2024-09-23
Payer: COMMERCIAL

## 2024-09-23 DIAGNOSIS — Z12.11 SCREEN FOR COLON CANCER: ICD-10-CM

## 2024-09-23 PROCEDURE — 82274 ASSAY TEST FOR BLOOD FECAL: CPT | Performed by: INTERNAL MEDICINE

## 2024-09-24 LAB — HEMOCCULT STL QL IA: NEGATIVE

## 2025-01-03 ENCOUNTER — LAB VISIT (OUTPATIENT)
Dept: LAB | Facility: HOSPITAL | Age: 45
End: 2025-01-03
Attending: INTERNAL MEDICINE
Payer: COMMERCIAL

## 2025-01-03 ENCOUNTER — OFFICE VISIT (OUTPATIENT)
Dept: FAMILY MEDICINE | Facility: CLINIC | Age: 45
End: 2025-01-03
Payer: COMMERCIAL

## 2025-01-03 VITALS
WEIGHT: 168.56 LBS | BODY MASS INDEX: 26.46 KG/M2 | DIASTOLIC BLOOD PRESSURE: 76 MMHG | HEIGHT: 67 IN | SYSTOLIC BLOOD PRESSURE: 108 MMHG | HEART RATE: 86 BPM | OXYGEN SATURATION: 97 %

## 2025-01-03 DIAGNOSIS — Z00.00 PREVENTATIVE HEALTH CARE: ICD-10-CM

## 2025-01-03 DIAGNOSIS — L70.0 ACNE VULGARIS: ICD-10-CM

## 2025-01-03 DIAGNOSIS — Z00.00 PREVENTATIVE HEALTH CARE: Primary | ICD-10-CM

## 2025-01-03 LAB
ALBUMIN SERPL BCP-MCNC: 3.8 G/DL (ref 3.5–5.2)
ALP SERPL-CCNC: 75 U/L (ref 40–150)
ALT SERPL W/O P-5'-P-CCNC: 38 U/L (ref 10–44)
ANION GAP SERPL CALC-SCNC: 10 MMOL/L (ref 8–16)
AST SERPL-CCNC: 25 U/L (ref 10–40)
BILIRUB SERPL-MCNC: 0.6 MG/DL (ref 0.1–1)
BUN SERPL-MCNC: 13 MG/DL (ref 6–20)
CALCIUM SERPL-MCNC: 9.3 MG/DL (ref 8.7–10.5)
CHLORIDE SERPL-SCNC: 106 MMOL/L (ref 95–110)
CHOLEST SERPL-MCNC: 218 MG/DL (ref 120–199)
CHOLEST/HDLC SERPL: 4.2 {RATIO} (ref 2–5)
CO2 SERPL-SCNC: 24 MMOL/L (ref 23–29)
CREAT SERPL-MCNC: 0.8 MG/DL (ref 0.5–1.4)
ERYTHROCYTE [DISTWIDTH] IN BLOOD BY AUTOMATED COUNT: 13.3 % (ref 11.5–14.5)
EST. GFR  (NO RACE VARIABLE): >60 ML/MIN/1.73 M^2
ESTIMATED AVG GLUCOSE: 100 MG/DL (ref 68–131)
GLUCOSE SERPL-MCNC: 81 MG/DL (ref 70–110)
HBA1C MFR BLD: 5.1 % (ref 4–5.6)
HCT VFR BLD AUTO: 41.5 % (ref 37–48.5)
HDLC SERPL-MCNC: 52 MG/DL (ref 40–75)
HDLC SERPL: 23.9 % (ref 20–50)
HGB BLD-MCNC: 13.4 G/DL (ref 12–16)
LDLC SERPL CALC-MCNC: 136.6 MG/DL (ref 63–159)
MCH RBC QN AUTO: 29 PG (ref 27–31)
MCHC RBC AUTO-ENTMCNC: 32.3 G/DL (ref 32–36)
MCV RBC AUTO: 90 FL (ref 82–98)
NONHDLC SERPL-MCNC: 166 MG/DL
PLATELET # BLD AUTO: 272 K/UL (ref 150–450)
PMV BLD AUTO: 10.6 FL (ref 9.2–12.9)
POTASSIUM SERPL-SCNC: 4 MMOL/L (ref 3.5–5.1)
PROT SERPL-MCNC: 7.1 G/DL (ref 6–8.4)
RBC # BLD AUTO: 4.62 M/UL (ref 4–5.4)
SODIUM SERPL-SCNC: 140 MMOL/L (ref 136–145)
TRIGL SERPL-MCNC: 147 MG/DL (ref 30–150)
TSH SERPL DL<=0.005 MIU/L-ACNC: 1.31 UIU/ML (ref 0.4–4)
WBC # BLD AUTO: 6.78 K/UL (ref 3.9–12.7)

## 2025-01-03 PROCEDURE — 80061 LIPID PANEL: CPT | Performed by: INTERNAL MEDICINE

## 2025-01-03 PROCEDURE — 80053 COMPREHEN METABOLIC PANEL: CPT | Performed by: INTERNAL MEDICINE

## 2025-01-03 PROCEDURE — 85027 COMPLETE CBC AUTOMATED: CPT | Performed by: INTERNAL MEDICINE

## 2025-01-03 PROCEDURE — 36415 COLL VENOUS BLD VENIPUNCTURE: CPT | Mod: PN | Performed by: INTERNAL MEDICINE

## 2025-01-03 PROCEDURE — 99999 PR PBB SHADOW E&M-EST. PATIENT-LVL III: CPT | Mod: PBBFAC,,, | Performed by: INTERNAL MEDICINE

## 2025-01-03 PROCEDURE — 84443 ASSAY THYROID STIM HORMONE: CPT | Performed by: INTERNAL MEDICINE

## 2025-01-03 PROCEDURE — 83036 HEMOGLOBIN GLYCOSYLATED A1C: CPT | Performed by: INTERNAL MEDICINE

## 2025-01-03 RX ORDER — TRETINOIN 0.5 MG/G
CREAM TOPICAL NIGHTLY
Qty: 45 G | Refills: 1 | Status: SHIPPED | OUTPATIENT
Start: 2025-01-03

## 2025-01-03 NOTE — PROGRESS NOTES
Assessment and Plan:    1. Preventative health care (Primary)  Discussed age appropriate preventative healthcare items such as cancer screenings and recommended immunizations. Discussed whether patient is using tobacco, alcohol, or illicit drugs and any concerns were discussed. Discussed maintenance of a healthy weight. Patient queried if she has any additional questions about preventative healthcare and all questions were answered.  - Comprehensive Metabolic Panel; Future  - CBC Without Differential; Future  - Lipid Panel; Future  - TSH; Future  - Hemoglobin A1C; Future    2. Acne vulgaris  - tretinoin (RETIN-A) 0.05 % cream; Apply topically every evening.  Dispense: 45 g; Refill: 1      Mammogram scheduled  Cervical cancer screening: s/p hysterectomy with removal of cervix for benign reasons  Colon cancer screening: FOBT negative   ______________________________________________________________________    Subjective:    Chief Complaint:  Annual exam    HPI:  Bharti is a 44 y.o. year old patient here for annual exam.     She notes that she is planning to work on getting more exercise this year.     She notes that she has been working on her diet overall in the last few months. She has been focusing on vegetables, fruits, and proteins. Does not eat out often, rare fast food.    Past Medical History:  Past Medical History:   Diagnosis Date    GERD (gastroesophageal reflux disease)     History of anemia        Past Surgical History:  Past Surgical History:   Procedure Laterality Date    ADENOIDECTOMY  1987     SECTION      X4    CYSTOSCOPY N/A 2020    Procedure: CYSTOSCOPY;  Surgeon: Radha Faye MD;  Location: [facility];  Service: OB/GYN;  Laterality: N/A;    HYSTERECTOMY      HYSTEROSCOPY N/A 6/15/2018    Procedure: HYSTEROSCOPY;  Surgeon: Radha Faye MD;  Location: [facility];  Service: OB/GYN;  Laterality: N/A;    LAPAROSCOPIC LYSIS OF ADHESIONS N/A 2020    Procedure: LYSIS,  ADHESIONS, LAPAROSCOPIC;  Surgeon: Radha Faye MD;  Location: [facility] OR;  Service: OB/GYN;  Laterality: N/A;    LAPAROSCOPIC SALPINGECTOMY Bilateral 2020    Procedure: SALPINGECTOMY, LAPAROSCOPIC;  Surgeon: Radha Faye MD;  Location: [facility] OR;  Service: OB/GYN;  Laterality: Bilateral;    LAPAROSCOPIC TOTAL HYSTERECTOMY N/A 2020    Procedure: HYSTERECTOMY, TOTAL, LAPAROSCOPIC;  Surgeon: Radha Faye MD;  Location: [facility] OR;  Service: OB/GYN;  Laterality: N/A;    THERMAL ABLATION OF ENDOMETRIUM USING HYSTEROSCOPY N/A 6/15/2018    Procedure: ABLATION ENDOMETRIAL THERMAL - NOVASURE;  Surgeon: Radha Faye MD;  Location: [facility];  Service: OB/GYN;  Laterality: N/A;    TONSILLECTOMY, ADENOIDECTOMY      TUBAL LIGATION      with 4th     WISDOM TOOTH EXTRACTION         Family History:  Family History   Problem Relation Name Age of Onset    Hypertension Paternal Grandfather      Heart disease Maternal Grandmother      Diabetes type II Maternal Grandmother          Borderline    Heart disease Maternal Grandfather      Hypertension Maternal Grandfather      Emphysema Maternal Grandfather          Smoker    Hypertension Father      Heart disease Father      No Known Problems Mother      Breast cancer Neg Hx      Ovarian cancer Neg Hx         Social History:  Social History     Socioeconomic History    Marital status:    Tobacco Use    Smoking status: Never    Smokeless tobacco: Never   Substance and Sexual Activity    Alcohol use: Yes     Alcohol/week: 0.0 - 1.0 standard drinks of alcohol     Comment: Occasional glass of wine    Drug use: No    Sexual activity: Yes     Partners: Male     Birth control/protection: See Surgical Hx   Social History Narrative    Works as a part time nurse for [employer]     Social Drivers of Health     Financial Resource Strain: Low Risk  (2025)    Overall Financial Resource Strain (CARDIA)     Difficulty of Paying  Living Expenses: Not hard at all   Food Insecurity: No Food Insecurity (1/2/2025)    Hunger Vital Sign     Worried About Running Out of Food in the Last Year: Never true     Ran Out of Food in the Last Year: Never true   Physical Activity: Insufficiently Active (1/2/2025)    Exercise Vital Sign     Days of Exercise per Week: 1 day     Minutes of Exercise per Session: 20 min   Stress: No Stress Concern Present (1/2/2025)    [employer] - Occupational Stress Questionnaire     Feeling of Stress : Only a little   Housing Stability: High Risk (1/2/2025)    Housing Stability Vital Sign     Unable to Pay for Housing in the Last Year: Yes       Medications:  Current Outpatient Medications on File Prior to Visit   Medication Sig Dispense Refill    cholecalciferol, vitamin D3, (VITAMIN D3) 10 mcg (400 unit) Cap capsule       ibuprofen 200 mg Cap       magnesium glycinate 100 mg magnesium Cap       tretinoin (RETIN-A) 0.05 % cream Apply topically every evening. 45 g 1     No current facility-administered medications on file prior to visit.       Allergies:  Latex, natural rubber    Immunizations:  Immunization History   Administered Date(s) Administered    Hepatitis B, Pediatric/Adolescent 04/14/1993, 07/08/1993, 01/26/1994    Influenza - Quadrivalent 09/29/2014    Influenza - Quadrivalent - PF *Preferred* (6 months and older) 09/29/2014, 10/10/2016    Influenza - Trivalent - Afluria, Fluzone MDV 10/15/2012, 10/01/2015    MMR 10/15/1980, 06/27/1990    Tdap 11/15/2018       Review of Systems:  Review of Systems   Constitutional:  Negative for activity change and unexpected weight change.   HENT:  Negative for hearing loss, rhinorrhea and trouble swallowing.    Eyes:  Negative for discharge and visual disturbance.   Respiratory:  Negative for wheezing.    Cardiovascular:  Positive for palpitations. Negative for chest pain.   Gastrointestinal:  Negative for blood in stool, constipation, diarrhea and  "vomiting.   Endocrine: Negative for polydipsia and polyuria.   Genitourinary:  Negative for difficulty urinating, dysuria, hematuria and menstrual problem.   Musculoskeletal:  Negative for arthralgias, joint swelling and neck pain.   Neurological:  Negative for weakness and headaches.   Psychiatric/Behavioral:  Positive for dysphoric mood. Negative for confusion.        Objective:    Vitals:  Vitals:    01/03/25 0814   Weight: 76.5 kg (168 lb 8.7 oz)   Height: 5' 7" (1.702 m)   PainSc: 0-No pain       Physical Exam  Vitals reviewed.   Constitutional:       General: She is not in acute distress.     Appearance: She is well-developed. She is not diaphoretic.   HENT:      Mouth/Throat:      Pharynx: No oropharyngeal exudate.   Eyes:      General: No scleral icterus.        Right eye: No discharge.         Left eye: No discharge.      Conjunctiva/sclera: Conjunctivae normal.   Neck:      Thyroid: No thyromegaly.      Trachea: No tracheal deviation.   Cardiovascular:      Rate and Rhythm: Normal rate and regular rhythm.      Heart sounds: Normal heart sounds. No murmur heard.  Pulmonary:      Effort: Pulmonary effort is normal. No respiratory distress.      Breath sounds: Normal breath sounds. No wheezing, rhonchi or rales.   Abdominal:      General: Bowel sounds are normal. There is no distension.      Palpations: Abdomen is soft.      Tenderness: There is no abdominal tenderness. There is no guarding or rebound.   Musculoskeletal:      Cervical back: Neck supple.      Right lower leg: No edema.      Left lower leg: No edema.   Lymphadenopathy:      Cervical: No cervical adenopathy.   Skin:     General: Skin is warm and dry.   Neurological:      Mental Status: She is alert and oriented to person, place, and time.   Psychiatric:         Behavior: Behavior normal.         Judgment: Judgment normal.         Data:  A1c 5.3 last year      Barbara Hall MD  Internal Medicine        "

## 2025-06-25 ENCOUNTER — PATIENT MESSAGE (OUTPATIENT)
Dept: FAMILY MEDICINE | Facility: CLINIC | Age: 45
End: 2025-06-25

## 2025-06-25 ENCOUNTER — OFFICE VISIT (OUTPATIENT)
Dept: FAMILY MEDICINE | Facility: CLINIC | Age: 45
End: 2025-06-25
Payer: COMMERCIAL

## 2025-06-25 VITALS
SYSTOLIC BLOOD PRESSURE: 106 MMHG | HEART RATE: 88 BPM | HEIGHT: 67 IN | TEMPERATURE: 99 F | BODY MASS INDEX: 25.81 KG/M2 | WEIGHT: 164.44 LBS | OXYGEN SATURATION: 99 % | DIASTOLIC BLOOD PRESSURE: 77 MMHG | RESPIRATION RATE: 18 BRPM

## 2025-06-25 DIAGNOSIS — B96.89 ACUTE BACTERIAL SINUSITIS: Primary | ICD-10-CM

## 2025-06-25 DIAGNOSIS — N95.1 PERIMENOPAUSE: ICD-10-CM

## 2025-06-25 DIAGNOSIS — J01.90 ACUTE BACTERIAL SINUSITIS: Primary | ICD-10-CM

## 2025-06-25 PROCEDURE — 99999 PR PBB SHADOW E&M-EST. PATIENT-LVL IV: CPT | Mod: PBBFAC,,, | Performed by: INTERNAL MEDICINE

## 2025-06-25 PROCEDURE — 3074F SYST BP LT 130 MM HG: CPT | Mod: CPTII,S$GLB,, | Performed by: INTERNAL MEDICINE

## 2025-06-25 PROCEDURE — 1159F MED LIST DOCD IN RCRD: CPT | Mod: CPTII,S$GLB,, | Performed by: INTERNAL MEDICINE

## 2025-06-25 PROCEDURE — 99214 OFFICE O/P EST MOD 30 MIN: CPT | Mod: S$GLB,,, | Performed by: INTERNAL MEDICINE

## 2025-06-25 PROCEDURE — 3078F DIAST BP <80 MM HG: CPT | Mod: CPTII,S$GLB,, | Performed by: INTERNAL MEDICINE

## 2025-06-25 PROCEDURE — 1160F RVW MEDS BY RX/DR IN RCRD: CPT | Mod: CPTII,S$GLB,, | Performed by: INTERNAL MEDICINE

## 2025-06-25 PROCEDURE — G2211 COMPLEX E/M VISIT ADD ON: HCPCS | Mod: S$GLB,,, | Performed by: INTERNAL MEDICINE

## 2025-06-25 PROCEDURE — 3008F BODY MASS INDEX DOCD: CPT | Mod: CPTII,S$GLB,, | Performed by: INTERNAL MEDICINE

## 2025-06-25 PROCEDURE — 3044F HG A1C LEVEL LT 7.0%: CPT | Mod: CPTII,S$GLB,, | Performed by: INTERNAL MEDICINE

## 2025-06-25 RX ORDER — ESTRADIOL 0.1 MG/G
1 CREAM VAGINAL
Qty: 42.5 G | Refills: 1 | Status: SHIPPED | OUTPATIENT
Start: 2025-06-26 | End: 2026-06-26

## 2025-06-25 RX ORDER — AMOXICILLIN AND CLAVULANATE POTASSIUM 875; 125 MG/1; MG/1
1 TABLET, FILM COATED ORAL EVERY 12 HOURS
Qty: 20 TABLET | Refills: 0 | Status: SHIPPED | OUTPATIENT
Start: 2025-06-25 | End: 2025-07-05

## 2025-06-25 RX ORDER — LEVOCETIRIZINE DIHYDROCHLORIDE 5 MG/1
5 TABLET, FILM COATED ORAL NIGHTLY
COMMUNITY

## 2025-06-25 RX ORDER — ESTRADIOL 0.05 MG/D
1 PATCH TRANSDERMAL
Qty: 4 PATCH | Refills: 2 | Status: SHIPPED | OUTPATIENT
Start: 2025-06-25 | End: 2026-06-25

## 2025-06-25 NOTE — PROGRESS NOTES
Assessment and Plan:    1. Acute bacterial sinusitis (Primary)  - amoxicillin-clavulanate 875-125mg (AUGMENTIN) 875-125 mg per tablet; Take 1 tablet by mouth every 12 (twelve) hours. for 10 days  Dispense: 20 tablet; Refill: 0    2. Perimenopause  Discussed symptoms in detail.  Discussed possible treatments.  Discussed potential risks of estrogen replacement therapy, including recent evidence that it is likely not as risky as previously thought.  She does not have a uterus, so no progesterone was prescribed.  We will start with low-dose transdermal weekly estrogen and twice weekly vaginal estrogen.  Follow-up in two months.  - estradiol 0.05 mg/24 hr td ptwk 0.05 mg/24 hr PTWK; Place 1 patch onto the skin every 7 days.  Dispense: 4 patch; Refill: 2  - estradioL (ESTRACE) 0.01 % (0.1 mg/gram) vaginal cream; Place 1 g vaginally twice a week.  Dispense: 42.5 g; Refill: 1    ______________________________________________________________________  Subjective:    Chief Complaint:  Persistent sinus congestion and cough    HPI:  Bharti is a 45 y.o. year old female here for evaluation of persistent sinus congestion and cough.     Symptoms started on May 31st. She felt congested with cough and mucus. Ran one elevated temp of 101 on June 1st. [facility] June [facility]nd. Tested negative for Flu A/B and COVID 19. Was prescribed and completed Azithromycin 250mg x5 days as well as prednisone 20 mg BID for 5 days. Minimal symptomatic improvement with this, but never ran fever again. Began to feel a little better but has continued with this cough and congestion and mucus, not improving with OTC medications. Mostly bothered by the persistent cough and congestions, sinus pressure and pain. Also just feeling generally fatigued.     She also wanted to discuss the possibility of HRT. She has not ever taken this before. She is s/p hysterectomy, but still has ovaries. No bleeding. Has been significantly bothered by  "vaginal dryness, but also having hot flashes and fatigue. Describes cognitive fatigue as well. She has no personal or family history of breast cancer.       Medications:  Medications Ordered Prior to Encounter[1]    Review of Systems:  Review of Systems   Constitutional:  Positive for fatigue. Negative for chills and fever.   HENT:  Positive for congestion, sinus pressure and sinus pain.    Respiratory:  Negative for shortness of breath and wheezing.    Endocrine: Positive for heat intolerance.   Genitourinary:  Positive for dyspareunia.       Past Medical History:  Past Medical History:   Diagnosis Date    GERD (gastroesophageal reflux disease)     History of anemia        Objective:    Vitals:  Vitals:    06/25/25 1111   BP: 106/77   Pulse: 88   Resp: 18   Temp: 98.8 °F (37.1 °C)   TempSrc: Oral   SpO2: 99%   Weight: 74.6 kg (164 lb 7.4 oz)   Height: 5' 7" (1.702 m)   PainSc: 0-No pain       Physical Exam  Vitals reviewed.   Constitutional:       General: She is not in acute distress.     Appearance: She is well-developed.   HENT:      Right Ear: Tympanic membrane and ear canal normal.      Left Ear: Tympanic membrane and ear canal normal.      Nose:      Right Sinus: Maxillary sinus tenderness and frontal sinus tenderness present.      Left Sinus: Maxillary sinus tenderness and frontal sinus tenderness present.      Mouth/Throat:      Pharynx: No posterior oropharyngeal erythema or postnasal drip.   Eyes:      General:         Right eye: No discharge.         Left eye: No discharge.      Conjunctiva/sclera: Conjunctivae normal.   Cardiovascular:      Rate and Rhythm: Normal rate and regular rhythm.   Pulmonary:      Effort: Pulmonary effort is normal. No respiratory distress.      Breath sounds: No wheezing, rhonchi or rales.   Skin:     General: Skin is warm and dry.   Neurological:      Mental Status: She is alert and oriented to person, place, and time.   Psychiatric:         Behavior: Behavior normal.         " Thought Content: Thought content normal.         Judgment: Judgment normal.         Data:  FSH still low in December of 2023    Barbara Hall MD  Internal Medicine         [1]   Current Outpatient Medications on File Prior to Visit   Medication Sig Dispense Refill    cholecalciferol, vitamin D3, (VITAMIN D3) 10 mcg (400 unit) Cap capsule       ibuprofen 200 mg Cap       levocetirizine (XYZAL) 5 MG tablet Take 5 mg by mouth every evening.      magnesium glycinate 100 mg magnesium Cap       tretinoin (RETIN-A) 0.05 % cream Apply topically every evening. 45 g 1     No current facility-administered medications on file prior to visit.

## 2025-08-27 ENCOUNTER — OFFICE VISIT (OUTPATIENT)
Dept: FAMILY MEDICINE | Facility: CLINIC | Age: 45
End: 2025-08-27
Payer: COMMERCIAL

## 2025-08-27 VITALS
BODY MASS INDEX: 26.3 KG/M2 | HEIGHT: 67 IN | TEMPERATURE: 98 F | RESPIRATION RATE: 18 BRPM | HEART RATE: 87 BPM | DIASTOLIC BLOOD PRESSURE: 76 MMHG | OXYGEN SATURATION: 98 % | SYSTOLIC BLOOD PRESSURE: 118 MMHG | WEIGHT: 167.56 LBS

## 2025-08-27 DIAGNOSIS — R68.83 CHILLS: ICD-10-CM

## 2025-08-27 DIAGNOSIS — U07.1 COVID: Primary | ICD-10-CM

## 2025-08-27 DIAGNOSIS — M79.10 MYALGIA: ICD-10-CM

## 2025-08-27 DIAGNOSIS — N95.1 PERIMENOPAUSE: ICD-10-CM

## 2025-08-27 LAB
CTP QC/QA: YES
CTP QC/QA: YES
POC MOLECULAR INFLUENZA A AGN: NEGATIVE
POC MOLECULAR INFLUENZA B AGN: NEGATIVE
SARS-COV-2 RDRP RESP QL NAA+PROBE: POSITIVE

## 2025-08-27 PROCEDURE — 99999 PR PBB SHADOW E&M-EST. PATIENT-LVL IV: CPT | Mod: PBBFAC,,, | Performed by: INTERNAL MEDICINE

## 2025-08-27 RX ORDER — ESTRADIOL 0.05 MG/D
1 PATCH TRANSDERMAL
Qty: 4 PATCH | Refills: 5 | Status: SHIPPED | OUTPATIENT
Start: 2025-08-27 | End: 2026-08-27